# Patient Record
Sex: FEMALE | Race: WHITE | NOT HISPANIC OR LATINO | Employment: FULL TIME | URBAN - METROPOLITAN AREA
[De-identification: names, ages, dates, MRNs, and addresses within clinical notes are randomized per-mention and may not be internally consistent; named-entity substitution may affect disease eponyms.]

---

## 2017-01-23 ENCOUNTER — OFFICE VISIT (OUTPATIENT)
Dept: OBSTETRICS AND GYNECOLOGY | Age: 37
End: 2017-01-23

## 2017-01-23 VITALS
SYSTOLIC BLOOD PRESSURE: 100 MMHG | WEIGHT: 195 LBS | BODY MASS INDEX: 27.92 KG/M2 | HEIGHT: 70 IN | DIASTOLIC BLOOD PRESSURE: 70 MMHG

## 2017-01-23 DIAGNOSIS — Z01.419 ENCOUNTER FOR GYNECOLOGICAL EXAMINATION WITHOUT ABNORMAL FINDING: Primary | ICD-10-CM

## 2017-01-23 DIAGNOSIS — E89.40 OVARIAN FAILURE DUE TO CANCER THERAPY: ICD-10-CM

## 2017-01-23 PROCEDURE — 99395 PREV VISIT EST AGE 18-39: CPT | Performed by: OBSTETRICS & GYNECOLOGY

## 2017-01-23 NOTE — MR AVS SNAPSHOT
Linda BARRIOS Yaron   2017 3:45 PM   Office Visit    Dept Phone:  [phone]   Encounter #:  [id number]    Provider:  Concetta Duncan MD   Department:  [facility]                Your Full Care Plan              Today's Medication Changes          These changes are accurate as of: 17  4:32 PM.  If you have any questions, ask your nurse or doctor.               Stop taking medication(s)listed here:     levothyroxine 150 MCG tablet   Commonly known as:  SYNTHROID                      Your Updated Medication List      Notice  As of 2017  4:32 PM    You have not been prescribed any medications.            You Were Diagnosed With        Codes Comments    Encounter for gynecological examination without abnormal finding    -  Primary ICD-10-CM: Z01.419  ICD-9-CM: V72.31     Ovarian failure due to cancer therapy     ICD-10-CM: E89.40  ICD-9-CM: 256.2       Instructions     None    Patient Instructions History      Upcoming Appointments     Visit Type Date Time Department    WELLNESS 2017  3:45 PM [facility]    LAB 2017  9:00 AM [facility] ONC CBC LAB [facility]      MyChart Signup     [facility] [employer] allows you to send messages to your doctor, view your test results, renew your prescriptions, schedule appointments, and more. To sign up, go to [email or web address] and click on the Sign Up Now link in the New User? box. Enter your [employer] Activation Code exactly as it appears below along with the last four digits of your Social Security Number and your Date of Birth () to complete the sign-up process. If you do not sign up before the expiration date, you must request a new code.    [employer] Activation Code: [email or web address]  Expires: 2017  5:34 AM    If you have questions, you can email [email or web address] or call [phone] to talk to our [employer] staff. Remember, [employer] is NOT to be used for urgent needs. For medical  "emergencies, dial 911.               Other Info from Your Visit           Your Appointments     Feb 14, 2017  9:00 AM EST   Lab with LAB CHAIR 4 [facility] ONCOLOGY CBC LAB ([place])    [address] [place] [id number]   [phone]              Allergies     No Known Allergies      Reason for Visit     Annual Exam PT HERE FOR ANNUAL EXAM, DUE TO HODGKINS LYMPHOMA DOES NOT HAVE PERIODS. NO COMPLAINTS, LAST PAP 2015 (NEG AND NEG HPV).      Vital Signs     Blood Pressure Height Weight Body Mass Index Smoking Status       100/70 70\" (177.8 cm) 195 lb (88.5 kg) 27.98 kg/m2 Never Smoker       Problems and Diagnoses Noted     Ovarian failure due to cancer therapy    Encounter for routine gynecological examination    -  Primary        "

## 2017-01-23 NOTE — PROGRESS NOTES
Routine Annual Visit    1/23/2017    Patient: Linda Marrufo          MR#:[id number]      Chief Complaint   Patient presents with   • Annual Exam     PT HERE FOR ANNUAL EXAM, DUE TO HODGKINS LYMPHOMA DOES NOT HAVE PERIODS. NO COMPLAINTS, LAST PAP 2015 (NEG AND NEG HPV).       History of Present Illness    36 y.o. female No obstetric history on file. who presents for annual exam.   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 Patient is not currently having periods (Reason: Other).  Obstetric History:  OB History     No data available         Menstrual History:     No LMP recorded. Patient is not currently having periods (Reason: Other).       Sexual History:       ________________________________________  Patient Active Problem List   Diagnosis   • Hodgkin's disease   • Ovarian failure due to cancer therapy   • Hypothyroidism, acquired       Past Medical History   Diagnosis Date   • HA (headache)    • History of ovarian cyst    • History of weight gain    • Hodgkin disease      RELAPSED   • Hypothyroidism    • Lymphoma    • Menopause    • Ovarian failure due to cancer therapy        Past Surgical History   Procedure Laterality Date   • Hernia repair       PT WAS 5 YO       History   Smoking Status   • Never Smoker   Smokeless Tobacco   • Not on file       currently has no medications in their medication list.  ________________________________________    Current contraception: post menopausal status  History of abnormal Pap smear: no  Family history of Breast cancer: no  Family history of uterine or ovarian cancer: no  Family History of colon cancer/colon polyps: no  History of abnormal mammogram: no      The following portions of the patient's history were reviewed and updated as appropriate: allergies, current medications, past family history, past medical  "history, past social history, past surgical history and problem list.    Review of Systems    Pertinent items are noted in HPI.     Objective   Physical Exam    Visit Vitals   • /70   • Ht 70\" (177.8 cm)   • Wt 195 lb (88.5 kg)   • LMP Comment: CHEMO   • BMI 27.98 kg/m2      BP Readings from Last 3 Encounters:   01/23/17 100/70   08/30/16 122/70      Wt Readings from Last 3 Encounters:   01/23/17 195 lb (88.5 kg)   08/30/16 197 lb 9.6 oz (89.6 kg)      BMI: Estimated body mass index is 27.98 kg/(m^2) as calculated from the following:    Height as of this encounter: 70\" (177.8 cm).    Weight as of this encounter: 195 lb (88.5 kg).      General:   alert, appears stated age and cooperative   Abdomen: soft, non-tender, without masses or organomegaly   Breast: inspection negative, no nipple discharge or bleeding, no masses or nodularity palpable   Vulva: normal   Vagina: normal mucosa   Cervix: no cervical motion tenderness and no lesions   Uterus: normal size, mobile or non-tender   Adnexa: normal adnexa and no mass, fullness, tenderness     Assessment:    1. Normal annual exam   Assessment     ICD-10-CM ICD-9-CM   1. Encounter for gynecological examination without abnormal finding Z01.419 V72.31   2. Ovarian failure due to cancer therapy E89.40 256.2     Plan:    Plan     []  Mammogram request made  []  PAP done  []  Labs:   []  GC/Chl/TV  []  DEXA scan   []  Referral for colonoscopy:     mammo age 38 as rec by onc    "

## 2017-02-14 ENCOUNTER — APPOINTMENT (OUTPATIENT)
Dept: LAB | Facility: HOSPITAL | Age: 37
End: 2017-02-14

## 2017-02-14 ENCOUNTER — LAB (OUTPATIENT)
Dept: LAB | Facility: HOSPITAL | Age: 37
End: 2017-02-14

## 2017-02-14 DIAGNOSIS — E03.9 HYPOTHYROIDISM, ACQUIRED: ICD-10-CM

## 2017-02-14 DIAGNOSIS — E89.40 OVARIAN FAILURE DUE TO CANCER THERAPY: ICD-10-CM

## 2017-02-14 DIAGNOSIS — C81.90 HODGKIN'S DISEASE (HCC): ICD-10-CM

## 2017-02-14 LAB — TSH SERPL DL<=0.05 MIU/L-ACNC: 5.79 MIU/ML (ref 0.27–4.2)

## 2017-02-14 PROCEDURE — 84443 ASSAY THYROID STIM HORMONE: CPT | Performed by: INTERNAL MEDICINE

## 2017-02-14 PROCEDURE — 36415 COLL VENOUS BLD VENIPUNCTURE: CPT | Performed by: INTERNAL MEDICINE

## 2017-02-16 ENCOUNTER — TELEPHONE (OUTPATIENT)
Dept: ONCOLOGY | Facility: CLINIC | Age: 37
End: 2017-02-16

## 2017-02-16 NOTE — TELEPHONE ENCOUNTER
----- Message from Cara Bliss sent at 2/16/2017 10:40 AM EST -----   Pt is calling about having some labs done        [phone]

## 2017-02-16 NOTE — TELEPHONE ENCOUNTER
Pt. States she was suppose to go to [facility] for labs tomorrow, but they told her she could get her labs done here rather than making a trip all the way there just for labs.  Also felt her md visit here should be moved up.  Last seen here by dr. Bhatt was 8/30/16 and his note states he would see her in 1 yr.  Pt. Would rather come in here and get labs and see dr. Bhatt in the next month or so.  Informed pt. i will s/w dr. Bhatt next week and will get back with her.  Pt denies having any problems at the present time.

## 2017-02-22 ENCOUNTER — TELEPHONE (OUTPATIENT)
Dept: ONCOLOGY | Facility: CLINIC | Age: 37
End: 2017-02-22

## 2017-02-22 DIAGNOSIS — C81.91 HODGKIN LYMPHOMA OF LYMPH NODES OF NECK, UNSPECIFIED HODGKIN LYMPHOMA TYPE (HCC): Primary | ICD-10-CM

## 2017-02-22 NOTE — TELEPHONE ENCOUNTER
S/w pt last wk concerning not going to [place] just for labs.  Would like to come here for labs and see dr. Bhatt before her sched. appt in august.  yearly pt now.  S/w dr. Bhatt, will sched pt. To come in for a cbc, iron panel, ferritin, cmp, and tsh and then 1 wk later  To see dr. Bhatt.  To see md at next available-2 units.  Informed pt. The appt desk will be calling her to get this set up.  V/u.

## 2017-02-28 ENCOUNTER — LAB (OUTPATIENT)
Dept: LAB | Facility: HOSPITAL | Age: 37
End: 2017-02-28

## 2017-02-28 DIAGNOSIS — C81.91 HODGKIN LYMPHOMA OF LYMPH NODES OF NECK, UNSPECIFIED HODGKIN LYMPHOMA TYPE (HCC): ICD-10-CM

## 2017-02-28 LAB
BASOPHILS # BLD AUTO: 0.03 10*3/MM3 (ref 0–0.1)
BASOPHILS NFR BLD AUTO: 0.6 % (ref 0–1.1)
DEPRECATED RDW RBC AUTO: 40.5 FL (ref 37–49)
EOSINOPHIL # BLD AUTO: 0.04 10*3/MM3 (ref 0–0.36)
EOSINOPHIL NFR BLD AUTO: 0.8 % (ref 1–5)
ERYTHROCYTE [DISTWIDTH] IN BLOOD BY AUTOMATED COUNT: 12.3 % (ref 11.7–14.5)
FERRITIN SERPL-MCNC: 344.9 NG/ML (ref 11–207)
FOLATE SERPL-MCNC: 16.83 NG/ML (ref 4.78–24.2)
HCT VFR BLD AUTO: 38.7 % (ref 34–45)
HGB BLD-MCNC: 12.9 G/DL (ref 11.5–14.9)
IMM GRANULOCYTES # BLD: 0.01 10*3/MM3 (ref 0–0.03)
IMM GRANULOCYTES NFR BLD: 0.2 % (ref 0–0.5)
LYMPHOCYTES # BLD AUTO: 2.18 10*3/MM3 (ref 1–3.5)
LYMPHOCYTES NFR BLD AUTO: 41.2 % (ref 20–49)
MCH RBC QN AUTO: 30.4 PG (ref 27–33)
MCHC RBC AUTO-ENTMCNC: 33.3 G/DL (ref 32–35)
MCV RBC AUTO: 91.1 FL (ref 83–97)
MONOCYTES # BLD AUTO: 0.45 10*3/MM3 (ref 0.25–0.8)
MONOCYTES NFR BLD AUTO: 8.5 % (ref 4–12)
NEUTROPHILS # BLD AUTO: 2.58 10*3/MM3 (ref 1.5–7)
NEUTROPHILS NFR BLD AUTO: 48.7 % (ref 39–75)
NRBC BLD MANUAL-RTO: 0 /100 WBC (ref 0–0)
PLATELET # BLD AUTO: 168 10*3/MM3 (ref 150–375)
PMV BLD AUTO: 8 FL (ref 8.9–12.1)
RBC # BLD AUTO: 4.25 10*6/MM3 (ref 3.9–5)
VIT B12 BLD-MCNC: 320 PG/ML (ref 250–999)
WBC NRBC COR # BLD: 5.29 10*3/MM3 (ref 4–10)

## 2017-02-28 PROCEDURE — 82728 ASSAY OF FERRITIN: CPT | Performed by: INTERNAL MEDICINE

## 2017-02-28 PROCEDURE — 82607 VITAMIN B-12: CPT | Performed by: INTERNAL MEDICINE

## 2017-02-28 PROCEDURE — 36415 COLL VENOUS BLD VENIPUNCTURE: CPT | Performed by: INTERNAL MEDICINE

## 2017-02-28 PROCEDURE — 85025 COMPLETE CBC W/AUTO DIFF WBC: CPT | Performed by: INTERNAL MEDICINE

## 2017-02-28 PROCEDURE — 82746 ASSAY OF FOLIC ACID SERUM: CPT | Performed by: INTERNAL MEDICINE

## 2017-03-09 ENCOUNTER — APPOINTMENT (OUTPATIENT)
Dept: LAB | Facility: HOSPITAL | Age: 37
End: 2017-03-09

## 2017-03-09 ENCOUNTER — OFFICE VISIT (OUTPATIENT)
Dept: ONCOLOGY | Facility: CLINIC | Age: 37
End: 2017-03-09

## 2017-03-09 VITALS
RESPIRATION RATE: 16 BRPM | HEIGHT: 72 IN | DIASTOLIC BLOOD PRESSURE: 66 MMHG | OXYGEN SATURATION: 100 % | WEIGHT: 194.4 LBS | BODY MASS INDEX: 26.33 KG/M2 | HEART RATE: 75 BPM | TEMPERATURE: 98.2 F | SYSTOLIC BLOOD PRESSURE: 92 MMHG

## 2017-03-09 DIAGNOSIS — C81.21 MIXED CELLULARITY HODGKIN LYMPHOMA OF LYMPH NODES OF NECK (HCC): Primary | ICD-10-CM

## 2017-03-09 DIAGNOSIS — E89.40 OVARIAN FAILURE DUE TO CANCER THERAPY: ICD-10-CM

## 2017-03-09 DIAGNOSIS — E03.9 HYPOTHYROIDISM, ACQUIRED: ICD-10-CM

## 2017-03-09 PROCEDURE — G0463 HOSPITAL OUTPT CLINIC VISIT: HCPCS | Performed by: INTERNAL MEDICINE

## 2017-03-09 PROCEDURE — 99213 OFFICE O/P EST LOW 20 MIN: CPT | Performed by: INTERNAL MEDICINE

## 2017-03-09 RX ORDER — LEVOTHYROXINE SODIUM 0.15 MG/1
150 TABLET ORAL DAILY
COMMUNITY
End: 2017-03-09 | Stop reason: SDUPTHER

## 2017-03-09 RX ORDER — LEVOTHYROXINE SODIUM 175 UG/1
175 TABLET ORAL DAILY
Qty: 90 TABLET | Refills: 3 | Status: SHIPPED | OUTPATIENT
Start: 2017-03-09 | End: 2018-05-08 | Stop reason: SDUPTHER

## 2017-03-09 NOTE — PROGRESS NOTES
Subjective   REASONS FOR FOLLOWUP:     1. Relapsed Hodgkin disease, now status post high dose therapy and autologous stem cell rescue at [place] on 01/28/2011.   2. Radiation therapy to the left neck and supraclavicular area at [facility] with Dr. Wilkerson completed 04/28/2011.   3. Ovarian failure following chemotherapy currently on hormone replacement and following up with Dr. Concetta Duncan, OB-GYN.   4. Hypothyroidism on Synthroid replacement.     HISTORY OF PRESENT ILLNESS:   Linda is a 36-year-old female with the above-noted history of recurrent Hodgkin disease treated with high-dose therapy and stem cell transplant at [facility] in January 2011. Following her transplant, she also received radiation therapy to the left neck and supraclavicular area which she completed in April 2011.    She was seen at the [place] bone marrow transplant clinic in February 2016 and at that point was 5 years out from her transplant and was released from further follow-up in [place].       She is here today for routine  followup and seems to be doing just fine.  He will need an adjustment on her thyroid replacement therapy since her TSH level was still greater than 5 on her last labs.  History of Present Illness      Past Medical History, Past Surgical History, Social History, Family History have been reviewed and are without significant changes except as mentioned.    Review of Systems   Constitutional: Negative for activity change, appetite change, fatigue, fever and unexpected weight change.   HENT: Negative for hearing loss, nosebleeds, trouble swallowing and voice change.    Eyes: Negative for visual disturbance.   Respiratory: Negative for cough, shortness of breath and wheezing.    Cardiovascular: Negative for chest pain and palpitations.   Gastrointestinal: Negative for abdominal pain, diarrhea, nausea and vomiting.   Genitourinary: Negative for difficulty urinating,  "frequency, hematuria and urgency.   Musculoskeletal: Negative for back pain and neck pain.   Skin: Negative for rash.   Neurological: Negative for dizziness, seizures, syncope and headaches.   Hematological: Negative for adenopathy. Does not bruise/bleed easily.   Psychiatric/Behavioral: Negative for behavioral problems. The patient is not nervous/anxious.       A comprehensive 14 point review of systems was performed and was negative except as mentioned.    Medications:  The current medication list was reviewed in the EMR    ALLERGIES:  No Known Allergies    Objective      Vitals:    03/09/17 1607   BP: 92/66   Pulse: 75   Resp: 16   Temp: 98.2 °F (36.8 °C)   TempSrc: Oral   SpO2: 100%   Weight: 194 lb 6.4 oz (88.2 kg)   Height: 72.05\" (183 cm)  Comment: new ht   PainSc: 0-No pain     Current Status 3/9/2017   ECOG score 0       Physical Exam   Constitutional: She is oriented to person, place, and time. She appears well-developed and well-nourished. No distress.   HENT:   Head: Normocephalic.   Eyes: Conjunctivae and EOM are normal. Pupils are equal, round, and reactive to light. No scleral icterus.   Neck: Normal range of motion. Neck supple. No JVD present. No thyromegaly present.   Cardiovascular: Normal rate and regular rhythm.  Exam reveals no gallop and no friction rub.    No murmur heard.  Pulmonary/Chest: Effort normal and breath sounds normal. She has no wheezes. She has no rales.   Abdominal: Soft. She exhibits no distension and no mass. There is no tenderness.   Musculoskeletal: Normal range of motion. She exhibits no edema or deformity.   Lymphadenopathy:     She has no cervical adenopathy.   Neurological: She is alert and oriented to person, place, and time. She has normal reflexes. No cranial nerve deficit.   Skin: Skin is warm and dry. No rash noted. No erythema.   Psychiatric: She has a normal mood and affect. Her behavior is normal. Judgment normal.         RECENT LABS:  Hematology WBC   Date Value " Ref Range Status   02/28/2017 5.29 4.00 - 10.00 10*3/mm3 Final     RBC   Date Value Ref Range Status   02/28/2017 4.25 3.90 - 5.00 10*6/mm3 Final     HEMOGLOBIN   Date Value Ref Range Status   02/28/2017 12.9 11.5 - 14.9 g/dL Final     HEMATOCRIT   Date Value Ref Range Status   02/28/2017 38.7 34.0 - 45.0 % Final     PLATELETS   Date Value Ref Range Status   02/28/2017 168 150 - 375 10*3/mm3 Final            Lab Results   Component Value Date    TSH 5.790 02/14/2017       Assessment/Plan   ASSESSMENT:   1. Hodgkin disease in remission following high dose therapy and autologous stem cell transplant at [place] in late January 2011. The patient continues to do well with no evidence of disease, now over 6 years out from her transplant.   2. Post-transplant consolidation radiation left neck and supraclavicular area completed 04/28/2011.   3. Ovarian failure following high-dose chemotherapy. The patient continues to followup with her OB/GYN, Dr. Concetta Duncan.   4. Hypothyroidism, currently on Synthroid replacement.  Her last TSH level was still greater than 5 therefore we will increase her dose of Synthroid from 150 µg daily to 175 µg daily.      PLAN:   1. She will return to our office in 6 months for routine followup.  Her labs will be drawn one week prior to that visit including CBC serum chemistries and thyroid studies.  2. We discussed when to start breast cancer screening and colon cancer screening. The latest recommendations indicate breast cancer screening 8-10 years after treatment or at age 40. Since her initial chest irradiation was in 2010 we will probably start breast imaging in 2018 when she is 38 and start screening colonoscopies at age 40.                   3/9/2017      CC:

## 2017-04-11 ENCOUNTER — TELEPHONE (OUTPATIENT)
Dept: OBSTETRICS AND GYNECOLOGY | Age: 37
End: 2017-04-11

## 2017-04-11 NOTE — TELEPHONE ENCOUNTER
Have her c/i for a problem visit next week with u/s, me, Dakota or Mely and confirm she isn't pregnant

## 2017-04-11 NOTE — TELEPHONE ENCOUNTER
"Dr GREEN pt, called and states no period since 2009 (pt was doing rad and chemo for Hodgkin's lym, completed in 03/2011) pt states 2 weeks ago synthroid changed from 150 mcg to 175mcg (has never had an issue w/ increase in med) has felt generally \"blah\" and 3 days ago she started spotting bright red, now darker red and using light tampons. No clots and only mild cramping. Please advise. Pt states she will be taking UPT after work.    Pt # [phone]  "

## 2017-04-18 ENCOUNTER — PROCEDURE VISIT (OUTPATIENT)
Dept: OBSTETRICS AND GYNECOLOGY | Age: 37
End: 2017-04-18

## 2017-04-18 ENCOUNTER — OFFICE VISIT (OUTPATIENT)
Dept: OBSTETRICS AND GYNECOLOGY | Age: 37
End: 2017-04-18

## 2017-04-18 VITALS
SYSTOLIC BLOOD PRESSURE: 100 MMHG | HEIGHT: 72 IN | WEIGHT: 189 LBS | BODY MASS INDEX: 25.6 KG/M2 | DIASTOLIC BLOOD PRESSURE: 70 MMHG

## 2017-04-18 DIAGNOSIS — E89.40 OVARIAN FAILURE DUE TO CANCER THERAPY: ICD-10-CM

## 2017-04-18 DIAGNOSIS — N92.6 MENSES, IRREGULAR: Primary | ICD-10-CM

## 2017-04-18 DIAGNOSIS — N93.9 VAGINA BLEEDING: Primary | ICD-10-CM

## 2017-04-18 PROCEDURE — 76830 TRANSVAGINAL US NON-OB: CPT | Performed by: PHYSICIAN ASSISTANT

## 2017-04-18 PROCEDURE — 99214 OFFICE O/P EST MOD 30 MIN: CPT | Performed by: NURSE PRACTITIONER

## 2017-04-18 RX ORDER — POLYMYXIN B SULFATE AND TRIMETHOPRIM 1; 10000 MG/ML; [USP'U]/ML
SOLUTION OPHTHALMIC
Refills: 0 | COMMUNITY
Start: 2017-03-28 | End: 2017-10-17

## 2017-04-18 NOTE — PROGRESS NOTES
"Subjective   Lindasa SOPHIE Marrufo is a 37 y.o. female is here today for follow-up.    History of Present Illness   Chief Complaint   Patient presents with   • Vaginal Bleeding     Linda, \"Kayla\" is here to discuss her cycles.  She is a patient of Dr Mena but a new patient to me.  She has a history of Lymphoma and premature ovarian failure after chemo.  She also underwent a stem cell transplant 6 years ago.  She has not had any vaginal bleeding since that time and was found to be menopausal base on FSH.  This month she started a period last week.  She spotted for a few days and then had 3-4 days of a normal flow, like a period.  It has stopped now.  She did check a pregnancy test which was negative.  She is on thyroid replacement and her PCP follows those levels every 6 months.  She had some cramping but nothing major.  She did have some breast tenderness a few weeks ago but it went away and she didn't think much of it.  She also noticed a migraine when she was bleeding but it is gone now as well.    The following portions of the patient's history were reviewed and updated as appropriate: allergies, current medications, past family history, past medical history, past social history, past surgical history and problem list.    Review of Systems   Constitutional: Negative.    HENT: Negative.    Eyes: Negative.    Respiratory: Negative.    Cardiovascular: Negative.    Gastrointestinal: Negative.    Endocrine: Negative.    Genitourinary: Negative.    Musculoskeletal: Negative.    Skin: Negative.    Allergic/Immunologic: Negative.    Neurological: Negative.    Hematological: Negative.    Psychiatric/Behavioral: Negative.        Objective   Physical Exam   Constitutional: She is oriented to person, place, and time. She appears well-developed and well-nourished.   Genitourinary: Vagina normal and uterus normal. Uterus is not tender. Cervix exhibits no motion tenderness, no discharge and no friability. Right adnexum " displays no mass and no tenderness. Left adnexum displays no mass and no tenderness. No bleeding in the vagina.   Genitourinary Comments: No bleeding    Neurological: She is alert and oriented to person, place, and time.   Skin: Skin is warm and dry.   Psychiatric: She has a normal mood and affect.         Assessment/Plan   Linda was seen today for vaginal bleeding.    Diagnoses and all orders for this visit:    Menses, irregular  -     Follicle Stimulating Hormone    Ovarian failure due to cancer therapy      Ultrasound done. Uterus normal and lining is 3.7mm.  There is a small follicle on her right ovary (9mm x 6mm) and this could be consistent with her current cycle day and potential ovulation. We will check an FSH level today.  Patient would love to be able to get pregnant if possible but hadn't considered it.  She will start on prenatal vitamins.  She isn't sure if her boyfriend really wants kids or not so she will discuss with him.  We did discuss AMA and she understands that risks for genetic disorders and pregnancy complications are higher as you age.  She will use condoms for now and continue to monitor. We will call with FSH.

## 2017-04-19 ENCOUNTER — TELEPHONE (OUTPATIENT)
Dept: OBSTETRICS AND GYNECOLOGY | Age: 37
End: 2017-04-19

## 2017-04-19 LAB — FSH SERPL-ACNC: 81.6 MIU/ML

## 2017-04-20 ENCOUNTER — TELEPHONE (OUTPATIENT)
Dept: OBSTETRICS AND GYNECOLOGY | Age: 37
End: 2017-04-20

## 2017-04-20 NOTE — TELEPHONE ENCOUNTER
NOTIFIED PT OF RESULTS, WOULD LIKE TO KNOW HOW IT IS POSSIBLE THAT IS SHE CAN BE IN MENOPAUSE? ALSO, WHAT IS NEXT STEP?

## 2017-04-20 NOTE — TELEPHONE ENCOUNTER
Discussed FSH continues to be menopausal  rec consult with RE if desires pregnancy, could consider donor eggs or adopt an embryo

## 2017-04-20 NOTE — TELEPHONE ENCOUNTER
----- Message from Concetta Duncan MD sent at 4/20/2017  6:41 AM EDT -----  Notify FSH is still in menopausal range

## 2017-08-17 ENCOUNTER — APPOINTMENT (OUTPATIENT)
Dept: LAB | Facility: HOSPITAL | Age: 37
End: 2017-08-17

## 2017-09-08 ENCOUNTER — APPOINTMENT (OUTPATIENT)
Dept: ONCOLOGY | Facility: CLINIC | Age: 37
End: 2017-09-08

## 2017-09-08 ENCOUNTER — APPOINTMENT (OUTPATIENT)
Dept: LAB | Facility: HOSPITAL | Age: 37
End: 2017-09-08

## 2017-10-06 ENCOUNTER — LAB (OUTPATIENT)
Dept: LAB | Facility: HOSPITAL | Age: 37
End: 2017-10-06

## 2017-10-06 DIAGNOSIS — C81.21 MIXED CELLULARITY HODGKIN LYMPHOMA OF LYMPH NODES OF NECK (HCC): ICD-10-CM

## 2017-10-06 DIAGNOSIS — E03.9 HYPOTHYROIDISM, ACQUIRED: ICD-10-CM

## 2017-10-06 DIAGNOSIS — C81.91 HODGKIN LYMPHOMA OF LYMPH NODES OF NECK, UNSPECIFIED HODGKIN LYMPHOMA TYPE (HCC): ICD-10-CM

## 2017-10-06 DIAGNOSIS — E89.40 OVARIAN FAILURE DUE TO CANCER THERAPY: ICD-10-CM

## 2017-10-06 LAB
ALBUMIN SERPL-MCNC: 4.2 G/DL (ref 3.5–5.2)
ALBUMIN/GLOB SERPL: 1.7 G/DL (ref 1.1–2.4)
ALP SERPL-CCNC: 47 U/L (ref 38–116)
ALT SERPL W P-5'-P-CCNC: 17 U/L (ref 0–33)
ANION GAP SERPL CALCULATED.3IONS-SCNC: 12.5 MMOL/L
AST SERPL-CCNC: 35 U/L (ref 0–32)
BASOPHILS # BLD AUTO: 0.03 10*3/MM3 (ref 0–0.1)
BASOPHILS NFR BLD AUTO: 0.6 % (ref 0–1.1)
BILIRUB SERPL-MCNC: 0.6 MG/DL (ref 0.1–1.2)
BUN BLD-MCNC: 14 MG/DL (ref 6–20)
BUN/CREAT SERPL: 15.9 (ref 7.3–30)
CALCIUM SPEC-SCNC: 9.3 MG/DL (ref 8.5–10.2)
CHLORIDE SERPL-SCNC: 103 MMOL/L (ref 98–107)
CO2 SERPL-SCNC: 26.5 MMOL/L (ref 22–29)
CREAT BLD-MCNC: 0.88 MG/DL (ref 0.6–1.1)
DEPRECATED RDW RBC AUTO: 38.9 FL (ref 37–49)
EOSINOPHIL # BLD AUTO: 0.06 10*3/MM3 (ref 0–0.36)
EOSINOPHIL NFR BLD AUTO: 1.3 % (ref 1–5)
ERYTHROCYTE [DISTWIDTH] IN BLOOD BY AUTOMATED COUNT: 11.9 % (ref 11.7–14.5)
FERRITIN SERPL-MCNC: 266.5 NG/ML (ref 11–207)
GFR SERPL CREATININE-BSD FRML MDRD: 72 ML/MIN/1.73
GLOBULIN UR ELPH-MCNC: 2.5 GM/DL (ref 1.8–3.5)
GLUCOSE BLD-MCNC: 91 MG/DL (ref 74–124)
HCT VFR BLD AUTO: 36.8 % (ref 34–45)
HGB BLD-MCNC: 12.6 G/DL (ref 11.5–14.9)
HGB RETIC QN: 35 PG (ref 29.8–36.1)
IMM GRANULOCYTES # BLD: 0.01 10*3/MM3 (ref 0–0.03)
IMM GRANULOCYTES NFR BLD: 0.2 % (ref 0–0.5)
IMM RETICS NFR: 8 % (ref 3–15.8)
IRON 24H UR-MRATE: 120 MCG/DL (ref 37–145)
LYMPHOCYTES # BLD AUTO: 2.03 10*3/MM3 (ref 1–3.5)
LYMPHOCYTES NFR BLD AUTO: 43.8 % (ref 20–49)
MCH RBC QN AUTO: 30.7 PG (ref 27–33)
MCHC RBC AUTO-ENTMCNC: 34.2 G/DL (ref 32–35)
MCV RBC AUTO: 89.8 FL (ref 83–97)
MONOCYTES # BLD AUTO: 0.41 10*3/MM3 (ref 0.25–0.8)
MONOCYTES NFR BLD AUTO: 8.8 % (ref 4–12)
NEUTROPHILS # BLD AUTO: 2.1 10*3/MM3 (ref 1.5–7)
NEUTROPHILS NFR BLD AUTO: 45.3 % (ref 39–75)
NRBC BLD MANUAL-RTO: 0 /100 WBC (ref 0–0)
PLATELET # BLD AUTO: 173 10*3/MM3 (ref 150–375)
PMV BLD AUTO: 8.1 FL (ref 8.9–12.1)
POTASSIUM BLD-SCNC: 3.7 MMOL/L (ref 3.5–4.7)
PROT SERPL-MCNC: 6.7 G/DL (ref 6.3–8)
RBC # BLD AUTO: 4.1 10*6/MM3 (ref 3.9–5)
RETICS/RBC NFR AUTO: 1.43 % (ref 0.6–2)
SODIUM BLD-SCNC: 142 MMOL/L (ref 134–145)
T4 FREE SERPL-MCNC: 1.58 NG/DL (ref 0.93–1.7)
TSH SERPL DL<=0.05 MIU/L-ACNC: 2.35 MIU/ML (ref 0.27–4.2)
WBC NRBC COR # BLD: 4.64 10*3/MM3 (ref 4–10)

## 2017-10-06 PROCEDURE — 80053 COMPREHEN METABOLIC PANEL: CPT | Performed by: INTERNAL MEDICINE

## 2017-10-06 PROCEDURE — 82728 ASSAY OF FERRITIN: CPT | Performed by: INTERNAL MEDICINE

## 2017-10-06 PROCEDURE — 36415 COLL VENOUS BLD VENIPUNCTURE: CPT | Performed by: INTERNAL MEDICINE

## 2017-10-06 PROCEDURE — 84443 ASSAY THYROID STIM HORMONE: CPT | Performed by: INTERNAL MEDICINE

## 2017-10-06 PROCEDURE — 83540 ASSAY OF IRON: CPT | Performed by: INTERNAL MEDICINE

## 2017-10-06 PROCEDURE — 84439 ASSAY OF FREE THYROXINE: CPT | Performed by: INTERNAL MEDICINE

## 2017-10-06 PROCEDURE — 85046 RETICYTE/HGB CONCENTRATE: CPT | Performed by: INTERNAL MEDICINE

## 2017-10-06 PROCEDURE — 85025 COMPLETE CBC W/AUTO DIFF WBC: CPT | Performed by: INTERNAL MEDICINE

## 2017-10-13 ENCOUNTER — APPOINTMENT (OUTPATIENT)
Dept: ONCOLOGY | Facility: CLINIC | Age: 37
End: 2017-10-13

## 2017-10-13 ENCOUNTER — APPOINTMENT (OUTPATIENT)
Dept: LAB | Facility: HOSPITAL | Age: 37
End: 2017-10-13

## 2017-10-17 ENCOUNTER — OFFICE VISIT (OUTPATIENT)
Dept: ONCOLOGY | Facility: CLINIC | Age: 37
End: 2017-10-17

## 2017-10-17 ENCOUNTER — APPOINTMENT (OUTPATIENT)
Dept: LAB | Facility: HOSPITAL | Age: 37
End: 2017-10-17

## 2017-10-17 VITALS
TEMPERATURE: 97.9 F | BODY MASS INDEX: 26.63 KG/M2 | HEIGHT: 72 IN | SYSTOLIC BLOOD PRESSURE: 112 MMHG | WEIGHT: 196.6 LBS | OXYGEN SATURATION: 98 % | RESPIRATION RATE: 12 BRPM | DIASTOLIC BLOOD PRESSURE: 78 MMHG | HEART RATE: 65 BPM

## 2017-10-17 DIAGNOSIS — E03.9 HYPOTHYROIDISM, ACQUIRED: ICD-10-CM

## 2017-10-17 DIAGNOSIS — C81.21 MIXED CELLULARITY HODGKIN LYMPHOMA OF LYMPH NODES OF NECK (HCC): Primary | ICD-10-CM

## 2017-10-17 PROCEDURE — 99213 OFFICE O/P EST LOW 20 MIN: CPT | Performed by: INTERNAL MEDICINE

## 2017-10-17 PROCEDURE — G0463 HOSPITAL OUTPT CLINIC VISIT: HCPCS | Performed by: INTERNAL MEDICINE

## 2017-10-17 NOTE — PROGRESS NOTES
Subjective   REASONS FOR FOLLOWUP:     1. Relapsed Hodgkin disease, now status post high dose therapy and autologous stem cell rescue at [place] on 01/28/2011.   2. Radiation therapy to the left neck and supraclavicular area at [facility] [place] with Dr. Wilkerson completed 04/28/2011.   3. Ovarian failure following chemotherapy currently on hormone replacement and following up with Dr. Concetta Duncan, OB-GYN.   4. Hypothyroidism on Synthroid replacement.     HISTORY OF PRESENT ILLNESS:   Linda is a 37 y.o. female with the above-noted history of recurrent Hodgkin disease treated with high-dose therapy and stem cell transplant at [facility] in January 2011. Following her transplant, she also received radiation therapy to the left neck and supraclavicular area which she completed in April 2011.    She was seen at the [place] bone marrow transplant clinic in February 2016 and at that point was 5 years out from her transplant and was released from further follow-up in [place].       She is here today for routine  followup and seems to be doing just fine.  She had her thyroid replacement dose increased on her last visit and underwent repeat labs on 10/6/2017 showing that her TSH and free T4 are now within normal limits.    She looks great and tells me that she is now working part-time at [employer] on [address].     History of Present Illness      Past Medical History, Past Surgical History, Social History, Family History have been reviewed and are without significant changes except as mentioned.    Review of Systems   Constitutional: Negative for activity change, appetite change, fatigue, fever and unexpected weight change.   HENT: Negative for hearing loss, nosebleeds, trouble swallowing and voice change.    Eyes: Negative for visual disturbance.   Respiratory: Negative for cough, shortness of breath and wheezing.    Cardiovascular: Negative for chest pain and  "palpitations.   Gastrointestinal: Negative for abdominal pain, diarrhea, nausea and vomiting.   Genitourinary: Negative for difficulty urinating, frequency, hematuria and urgency.   Musculoskeletal: Negative for back pain and neck pain.   Skin: Negative for rash.   Neurological: Negative for dizziness, seizures, syncope and headaches.   Hematological: Negative for adenopathy. Does not bruise/bleed easily.   Psychiatric/Behavioral: Negative for behavioral problems. The patient is not nervous/anxious.       A comprehensive 14 point review of systems was performed and was negative except as mentioned.    Medications:  The current medication list was reviewed in the EMR    ALLERGIES:  No Known Allergies    Objective      Vitals:    10/17/17 0821   BP: 112/78   Pulse: 65   Resp: 12   Temp: 97.9 °F (36.6 °C)   TempSrc: Oral   SpO2: 98%   Weight: 196 lb 9.6 oz (89.2 kg)   Height: 72.44\" (184 cm)  Comment: new height   PainSc: 0-No pain     Current Status 10/17/2017   ECOG score 0       Physical Exam   Constitutional: She is oriented to person, place, and time. She appears well-developed and well-nourished. No distress.   HENT:   Head: Normocephalic.   Eyes: Conjunctivae and EOM are normal. Pupils are equal, round, and reactive to light. No scleral icterus.   Neck: Normal range of motion. Neck supple. No JVD present. No thyromegaly present.   Cardiovascular: Normal rate and regular rhythm.  Exam reveals no gallop and no friction rub.    No murmur heard.  Pulmonary/Chest: Effort normal and breath sounds normal. She has no wheezes. She has no rales.   Abdominal: Soft. She exhibits no distension and no mass. There is no tenderness.   Musculoskeletal: Normal range of motion. She exhibits no edema or deformity.   Lymphadenopathy:     She has no cervical adenopathy.   Neurological: She is alert and oriented to person, place, and time. She has normal reflexes. No cranial nerve deficit.   Skin: Skin is warm and dry. No rash noted. " No erythema.   Psychiatric: She has a normal mood and affect. Her behavior is normal. Judgment normal.         RECENT LABS:  Hematology WBC   Date Value Ref Range Status   10/06/2017 4.64 4.00 - 10.00 10*3/mm3 Final     RBC   Date Value Ref Range Status   10/06/2017 4.10 3.90 - 5.00 10*6/mm3 Final     Hemoglobin   Date Value Ref Range Status   10/06/2017 12.6 11.5 - 14.9 g/dL Final     Hematocrit   Date Value Ref Range Status   10/06/2017 36.8 34.0 - 45.0 % Final     Platelets   Date Value Ref Range Status   10/06/2017 173 150 - 375 10*3/mm3 Final            Lab Results   Component Value Date    TSH 2.350 10/06/2017       Assessment/Plan   ASSESSMENT:   1. Hodgkin disease in remission following high dose therapy and autologous stem cell transplant at [place] in late January 2011. The patient continues to do well with no evidence of disease, now over 6 years out from her transplant.   2. Post-transplant consolidation radiation left neck and supraclavicular area completed 04/28/2011.   3. Ovarian failure following high-dose chemotherapy. The patient continues to followup with her OB/GYN, Dr. Concetta Duncan.                   4. Hypothyroidism, currently on Synthroid replacement.     PLAN:   1. At this point, we feel Priscilla can follow-up in our office on an annual basis with repeat labs drawn 1 week prior to visit including CBC, serum chemistries, and thyroid studies.  2. We discussed when to start breast cancer screening and colon cancer screening. The latest recommendations indicate breast cancer screening 8-10 years after treatment or at age 40. Since her initial chest irradiation was in 2010 we would recommend starting breast imaging in 2018 when she is 38 and start screening colonoscopies at age 40.                   10/17/2017      CC:

## 2018-04-12 ENCOUNTER — TELEPHONE (OUTPATIENT)
Dept: ORTHOPEDIC SURGERY | Facility: CLINIC | Age: 38
End: 2018-04-12

## 2018-04-13 NOTE — TELEPHONE ENCOUNTER
I thought this one was also yours.    Unfortunately, as discussed, I really don't have any slots until after I get back.    She may want to see the sports medicine docs at [facility], or you may want to check with someone else here in the office.    Sorry.

## 2018-05-08 DIAGNOSIS — E03.9 HYPOTHYROIDISM, ACQUIRED: ICD-10-CM

## 2018-05-08 DIAGNOSIS — C81.21 MIXED CELLULARITY HODGKIN LYMPHOMA OF LYMPH NODES OF NECK (HCC): ICD-10-CM

## 2018-05-08 DIAGNOSIS — E89.40 OVARIAN FAILURE DUE TO CANCER THERAPY: ICD-10-CM

## 2018-05-08 RX ORDER — LEVOTHYROXINE SODIUM 175 UG/1
TABLET ORAL
Qty: 30 TABLET | Refills: 2 | Status: SHIPPED | OUTPATIENT
Start: 2018-05-08 | End: 2019-07-01 | Stop reason: SDUPTHER

## 2019-01-07 ENCOUNTER — PROCEDURE VISIT (OUTPATIENT)
Dept: OBSTETRICS AND GYNECOLOGY | Age: 39
End: 2019-01-07

## 2019-01-07 ENCOUNTER — OFFICE VISIT (OUTPATIENT)
Dept: OBSTETRICS AND GYNECOLOGY | Age: 39
End: 2019-01-07

## 2019-01-07 ENCOUNTER — APPOINTMENT (OUTPATIENT)
Dept: WOMENS IMAGING | Facility: HOSPITAL | Age: 39
End: 2019-01-07

## 2019-01-07 VITALS
SYSTOLIC BLOOD PRESSURE: 112 MMHG | BODY MASS INDEX: 24.5 KG/M2 | DIASTOLIC BLOOD PRESSURE: 66 MMHG | HEIGHT: 71 IN | WEIGHT: 175 LBS

## 2019-01-07 DIAGNOSIS — Z12.31 VISIT FOR SCREENING MAMMOGRAM: Primary | ICD-10-CM

## 2019-01-07 DIAGNOSIS — Z11.51 SCREENING FOR HPV (HUMAN PAPILLOMAVIRUS): ICD-10-CM

## 2019-01-07 DIAGNOSIS — Z12.4 SCREENING FOR CERVICAL CANCER: ICD-10-CM

## 2019-01-07 DIAGNOSIS — Z01.419 ENCOUNTER FOR GYNECOLOGICAL EXAMINATION WITHOUT ABNORMAL FINDING: Primary | ICD-10-CM

## 2019-01-07 DIAGNOSIS — Z12.39 SCREENING FOR BREAST CANCER: ICD-10-CM

## 2019-01-07 PROCEDURE — 77067 SCR MAMMO BI INCL CAD: CPT | Performed by: RADIOLOGY

## 2019-01-07 PROCEDURE — 99395 PREV VISIT EST AGE 18-39: CPT | Performed by: OBSTETRICS & GYNECOLOGY

## 2019-01-07 PROCEDURE — 77067 SCR MAMMO BI INCL CAD: CPT | Performed by: OBSTETRICS & GYNECOLOGY

## 2019-01-07 NOTE — PROGRESS NOTES
Routine Annual Visit    2019    Patient: Linda Marrufo          MR#:[id number]      Chief Complaint   Patient presents with   • Annual Exam     PT HERE FOR ROUTINE AE, SHE IS WELL. LAST PAP  NEG. HAS HAD 2 PERIODS SINCE 2017.       History of Present Illness    38 y.o. female  who presents for annual exam.   No HF or NS  Menopausal due to chemotherapy with previous lymphoma, pt had BMT in past  Stable from onc view  Will need yearly mammo starting this year secondary to history of chest radiation  Did consult with [facility]    Broke up with BF of 12 years, pt is doing ok        No LMP recorded. Patient is not currently having periods (Reason: Other).  Obstetric History:  OB History      Para Term  AB Living    0 0 0 0 0 0    SAB TAB Ectopic Molar Multiple Live Births    0 0 0   0           Menstrual History:     No LMP recorded. Patient is not currently having periods (Reason: Other).       Sexual History:       ________________________________________  Patient Active Problem List   Diagnosis   • Hodgkin's disease (CMS/HCC)   • Ovarian failure due to cancer therapy   • Hypothyroidism, acquired       Past Medical History:   Diagnosis Date   • HA (headache)    • History of ovarian cyst    • History of weight gain    • Hodgkin disease (CMS/HCC)     RELAPSED   • Hypothyroidism    • Lymphoma (CMS/HCC)    • Menopause    • Ovarian failure due to cancer therapy        Past Surgical History:   Procedure Laterality Date   • HERNIA REPAIR      PT WAS 3 YO       Social History     Tobacco Use   Smoking Status Never Smoker       has a current medication list which includes the following prescription(s): levothyroxine.  ________________________________________    Current contraception: none  History of abnormal Pap smear: no  Family history of Breast cancer: no  Family history of uterine or ovarian cancer: no  Family History of colon cancer/colon polyps: no  History of abnormal mammogram:  "no      The following portions of the patient's history were reviewed and updated as appropriate: allergies, current medications, past family history, past medical history, past social history, past surgical history and problem list.    Review of Systems    Pertinent items are noted in HPI.     Objective   Physical Exam    /66   Ht 180.3 cm (71\")   Wt 79.4 kg (175 lb)   BMI 24.41 kg/m²    BP Readings from Last 3 Encounters:   01/07/19 112/66   10/17/17 112/78   04/18/17 100/70      Wt Readings from Last 3 Encounters:   01/07/19 79.4 kg (175 lb)   10/17/17 89.2 kg (196 lb 9.6 oz)   04/18/17 85.7 kg (189 lb)      BMI: Estimated body mass index is 24.41 kg/m² as calculated from the following:    Height as of this encounter: 180.3 cm (71\").    Weight as of this encounter: 79.4 kg (175 lb).      General:   alert, appears stated age and cooperative   Abdomen: soft, non-tender, without masses or organomegaly   Breast: inspection negative, no nipple discharge or bleeding, no masses or nodularity palpable   Vulva: normal   Vagina: normal mucosa   Cervix: no cervical motion tenderness and no lesions   Uterus: normal size, mobile or non-tender   Adnexa: no mass, fullness, tenderness     Assessment:    1. Normal annual exam   Assessment     ICD-10-CM ICD-9-CM   1. Encounter for gynecological examination without abnormal finding Z01.419 V72.31   2. Screening for cervical cancer Z12.4 V76.2   3. Screening for HPV (human papillomavirus) Z11.51 V73.81   4. Screening for breast cancer Z12.31 V76.10     Plan:    Plan     [x]  Mammogram request made  [x]  PAP done  []  Labs:   []  GC/Chl/TV  []  DEXA scan   []  Referral for colonoscopy:       Linda was seen today for annual exam.    Diagnoses and all orders for this visit:    Encounter for gynecological examination without abnormal finding  -     IGP, Aptima HPV, Rfx 16 / 18,45    Screening for cervical cancer  -     IGP, Aptima HPV, Rfx 16 / 18,45    Screening for HPV " (human papillomavirus)  -     IGP, Aptima HPV, Rfx 16 / 18,45    Screening for breast cancer  -     Mammo Screening Bilateral With CAD; Future            Counseling:  --Nutrition: Stressed importance of moderation and caloric balance, stressed fresh fruit and vegetables  --Exercise: Stressed the importance of regular exercise. 3-5 times weekly       --Discussed pap smear screening recommendations

## 2019-01-09 ENCOUNTER — DOCUMENTATION (OUTPATIENT)
Dept: ONCOLOGY | Facility: CLINIC | Age: 39
End: 2019-01-09

## 2019-01-09 NOTE — PROGRESS NOTES
To Whom It May Concern,    Linda Serrato (date of birth 1980) was treated in our practice for Hodgkin's disease.  She required chemotherapy, radiation therapy and high-dose chemotherapy with stem cell transplant.    She has suffered some late toxicities from her treatment including hypothyroidism with associated weight gain and very stiff muscles from her prior radiation treatments.    We agree that her soft tissue massages and enrollment in weight watchers to control her weight to a healthy level are medically indicated in this setting.  We feel that using her health savings account dollars on these modalities is entirely justified.    Your consideration in this matter is appreciated.        Sincerely,    Marshal Bhatt M.D.

## 2019-01-11 LAB
CYTOLOGIST CVX/VAG CYTO: NORMAL
CYTOLOGY CVX/VAG DOC THIN PREP: NORMAL
DX ICD CODE: NORMAL
HIV 1 & 2 AB SER-IMP: NORMAL
HPV I/H RISK 4 DNA CVX QL PROBE+SIG AMP: NEGATIVE
Lab: NORMAL
OTHER STN SPEC: NORMAL
PATH REPORT.FINAL DX SPEC: NORMAL
STAT OF ADQ CVX/VAG CYTO-IMP: NORMAL

## 2019-01-14 ENCOUNTER — TELEPHONE (OUTPATIENT)
Dept: OBSTETRICS AND GYNECOLOGY | Age: 39
End: 2019-01-14

## 2019-07-01 ENCOUNTER — TELEPHONE (OUTPATIENT)
Dept: ONCOLOGY | Facility: HOSPITAL | Age: 39
End: 2019-07-01

## 2019-07-01 DIAGNOSIS — C81.21 MIXED CELLULARITY HODGKIN LYMPHOMA OF LYMPH NODES OF NECK (HCC): ICD-10-CM

## 2019-07-01 DIAGNOSIS — E89.40 OVARIAN FAILURE DUE TO CANCER THERAPY: ICD-10-CM

## 2019-07-01 DIAGNOSIS — E03.9 HYPOTHYROIDISM, ACQUIRED: ICD-10-CM

## 2019-07-01 RX ORDER — LEVOTHYROXINE SODIUM 175 UG/1
175 TABLET ORAL DAILY
Qty: 30 TABLET | Refills: 2 | Status: SHIPPED | OUTPATIENT
Start: 2019-07-01 | End: 2020-01-06

## 2019-07-01 NOTE — TELEPHONE ENCOUNTER
----- Message from Camila Morrison sent at 7/1/2019  8:36 AM EDT -----  [phone]    Changing pharmacy new one is  [address]  [phone]    Needs levothyroxine  150 she thinks.    Call her when it's been done    Thanks

## 2020-01-03 DIAGNOSIS — E03.9 HYPOTHYROIDISM, ACQUIRED: ICD-10-CM

## 2020-01-03 DIAGNOSIS — C81.21 MIXED CELLULARITY HODGKIN LYMPHOMA OF LYMPH NODES OF NECK (HCC): ICD-10-CM

## 2020-01-03 DIAGNOSIS — E89.40 OVARIAN FAILURE DUE TO CANCER THERAPY: ICD-10-CM

## 2020-01-06 RX ORDER — LEVOTHYROXINE SODIUM 175 UG/1
TABLET ORAL
Qty: 30 TABLET | Refills: 0 | Status: SHIPPED | OUTPATIENT
Start: 2020-01-06 | End: 2020-02-27

## 2020-01-20 ENCOUNTER — LAB (OUTPATIENT)
Dept: LAB | Facility: HOSPITAL | Age: 40
End: 2020-01-20

## 2020-01-20 DIAGNOSIS — E03.9 HYPOTHYROIDISM, ACQUIRED: Primary | ICD-10-CM

## 2020-01-20 LAB
ALBUMIN SERPL-MCNC: 4.3 G/DL (ref 3.5–5.2)
ALBUMIN/GLOB SERPL: 1.8 G/DL (ref 1.1–2.4)
ALP SERPL-CCNC: 51 U/L (ref 38–116)
ALT SERPL W P-5'-P-CCNC: 20 U/L (ref 0–33)
ANION GAP SERPL CALCULATED.3IONS-SCNC: 10.3 MMOL/L (ref 5–15)
AST SERPL-CCNC: 23 U/L (ref 0–32)
BASOPHILS # BLD AUTO: 0.02 10*3/MM3 (ref 0–0.2)
BASOPHILS NFR BLD AUTO: 0.4 % (ref 0–1.5)
BILIRUB SERPL-MCNC: 0.4 MG/DL (ref 0.2–1.2)
BUN BLD-MCNC: 10 MG/DL (ref 6–20)
BUN/CREAT SERPL: 11 (ref 7.3–30)
CALCIUM SPEC-SCNC: 9.4 MG/DL (ref 8.5–10.2)
CHLORIDE SERPL-SCNC: 103 MMOL/L (ref 98–107)
CO2 SERPL-SCNC: 27.7 MMOL/L (ref 22–29)
CREAT BLD-MCNC: 0.91 MG/DL (ref 0.6–1.1)
DEPRECATED RDW RBC AUTO: 43.2 FL (ref 37–54)
EOSINOPHIL # BLD AUTO: 0.04 10*3/MM3 (ref 0–0.4)
EOSINOPHIL NFR BLD AUTO: 0.7 % (ref 0.3–6.2)
ERYTHROCYTE [DISTWIDTH] IN BLOOD BY AUTOMATED COUNT: 12.6 % (ref 12.3–15.4)
GFR SERPL CREATININE-BSD FRML MDRD: 69 ML/MIN/1.73
GLOBULIN UR ELPH-MCNC: 2.4 GM/DL (ref 1.8–3.5)
GLUCOSE BLD-MCNC: 100 MG/DL (ref 74–124)
HCT VFR BLD AUTO: 38 % (ref 34–46.6)
HGB BLD-MCNC: 12.7 G/DL (ref 12–15.9)
IMM GRANULOCYTES # BLD AUTO: 0.01 10*3/MM3 (ref 0–0.05)
IMM GRANULOCYTES NFR BLD AUTO: 0.2 % (ref 0–0.5)
LYMPHOCYTES # BLD AUTO: 1.93 10*3/MM3 (ref 0.7–3.1)
LYMPHOCYTES NFR BLD AUTO: 34.7 % (ref 19.6–45.3)
MCH RBC QN AUTO: 31.2 PG (ref 26.6–33)
MCHC RBC AUTO-ENTMCNC: 33.4 G/DL (ref 31.5–35.7)
MCV RBC AUTO: 93.4 FL (ref 79–97)
MONOCYTES # BLD AUTO: 0.46 10*3/MM3 (ref 0.1–0.9)
MONOCYTES NFR BLD AUTO: 8.3 % (ref 5–12)
NEUTROPHILS # BLD AUTO: 3.1 10*3/MM3 (ref 1.7–7)
NEUTROPHILS NFR BLD AUTO: 55.7 % (ref 42.7–76)
NRBC BLD AUTO-RTO: 0 /100 WBC (ref 0–0.2)
PLATELET # BLD AUTO: 197 10*3/MM3 (ref 140–450)
PMV BLD AUTO: 8.1 FL (ref 6–12)
POTASSIUM BLD-SCNC: 4.2 MMOL/L (ref 3.5–4.7)
PROT SERPL-MCNC: 6.7 G/DL (ref 6.3–8)
RBC # BLD AUTO: 4.07 10*6/MM3 (ref 3.77–5.28)
SODIUM BLD-SCNC: 141 MMOL/L (ref 134–145)
T4 FREE SERPL-MCNC: 0.81 NG/DL (ref 0.93–1.7)
TSH SERPL DL<=0.05 MIU/L-ACNC: 14.3 UIU/ML (ref 0.27–4.2)
WBC NRBC COR # BLD: 5.56 10*3/MM3 (ref 3.4–10.8)

## 2020-01-20 PROCEDURE — 36415 COLL VENOUS BLD VENIPUNCTURE: CPT

## 2020-01-20 PROCEDURE — 85025 COMPLETE CBC W/AUTO DIFF WBC: CPT

## 2020-01-20 PROCEDURE — 84443 ASSAY THYROID STIM HORMONE: CPT | Performed by: INTERNAL MEDICINE

## 2020-01-20 PROCEDURE — 84439 ASSAY OF FREE THYROXINE: CPT | Performed by: INTERNAL MEDICINE

## 2020-01-20 PROCEDURE — 80053 COMPREHEN METABOLIC PANEL: CPT

## 2020-01-23 ENCOUNTER — TELEPHONE (OUTPATIENT)
Dept: ONCOLOGY | Facility: CLINIC | Age: 40
End: 2020-01-23

## 2020-01-23 ENCOUNTER — TELEPHONE (OUTPATIENT)
Dept: ONCOLOGY | Facility: HOSPITAL | Age: 40
End: 2020-01-23

## 2020-01-23 NOTE — TELEPHONE ENCOUNTER
DR. GUTIERREZ HAD SAID THAT PT COULD CANCEL APPT NEXT WK. APPT DESK HAD ALREADY BEEN NOTIFIED PER DR. GUTIERREZ AND HAD CANCELED NEXT WK AND HAD PT RESCHEDULED FOR FEB W/ LABS DONE 1 WK PRIOR. PT DOESN'T WANT TO COME IN NEXT WK TO HAVE LABS REPEATED ANYMORE.

## 2020-01-23 NOTE — TELEPHONE ENCOUNTER
----- Message from Marshal Bhatt MD sent at 1/23/2020  1:21 PM EST -----  Regarding: RE: PT WANTING TO CANCEL MD VS MONDAY IF OK   That is OK with me. She needs to have lab with the next appt ( CBC,CMP, TSH,T4 )  ----- Message -----  From: Nina Salgado RN  Sent: 1/23/2020  11:33 AM EST  To: Marshal Bhatt MD  Subject: PT WANTING TO CANCEL MD VS MONDAY IF OK          PT HAS APPT TO SEE YOU Monday (NO LABS). PT WOULD LIKE TO GET LABS RECHECKED NEXT WK (TSH, T4). THEN SEE YOU MID FEB IF OK W/ YOU. PT HAD HER MEDS STOLEN WHILE ON VACATION AND WAS OFF MEDS WHEN LABS CHECKED (REASON THEY WERE OFF). SHE WANTS TO GET HER ROUTINE BACK AND THEN F/U W/ YOU. OK?

## 2020-01-27 ENCOUNTER — APPOINTMENT (OUTPATIENT)
Dept: LAB | Facility: HOSPITAL | Age: 40
End: 2020-01-27

## 2020-01-29 ENCOUNTER — OFFICE VISIT (OUTPATIENT)
Dept: OBSTETRICS AND GYNECOLOGY | Age: 40
End: 2020-01-29

## 2020-01-29 ENCOUNTER — APPOINTMENT (OUTPATIENT)
Dept: WOMENS IMAGING | Facility: HOSPITAL | Age: 40
End: 2020-01-29

## 2020-01-29 ENCOUNTER — PROCEDURE VISIT (OUTPATIENT)
Dept: OBSTETRICS AND GYNECOLOGY | Age: 40
End: 2020-01-29

## 2020-01-29 VITALS
HEIGHT: 71 IN | BODY MASS INDEX: 29.68 KG/M2 | DIASTOLIC BLOOD PRESSURE: 62 MMHG | SYSTOLIC BLOOD PRESSURE: 102 MMHG | WEIGHT: 212 LBS

## 2020-01-29 DIAGNOSIS — Z12.31 VISIT FOR SCREENING MAMMOGRAM: Primary | ICD-10-CM

## 2020-01-29 DIAGNOSIS — Z01.419 ENCOUNTER FOR GYNECOLOGICAL EXAMINATION (GENERAL) (ROUTINE) WITHOUT ABNORMAL FINDINGS: Primary | ICD-10-CM

## 2020-01-29 PROCEDURE — 77067 SCR MAMMO BI INCL CAD: CPT | Performed by: RADIOLOGY

## 2020-01-29 PROCEDURE — 77067 SCR MAMMO BI INCL CAD: CPT | Performed by: OBSTETRICS & GYNECOLOGY

## 2020-01-29 PROCEDURE — 99395 PREV VISIT EST AGE 18-39: CPT | Performed by: OBSTETRICS & GYNECOLOGY

## 2020-01-29 NOTE — PROGRESS NOTES
Routine Annual Visit    2020    Patient: Linda Marrufo          MR#:[id number]      Chief Complaint   Patient presents with   • Gynecologic Exam     AE AND MG BEFORE EXAM TODAY. 2019 PAP = NEG/NEG. CONTRACEPTION = NONE.  NO COMPLAINTS. NO CYCLES.  PERSONAL HX OF CANCER.        History of Present Illness    39 y.o. female  who presents for annual exam.   Doing well, no menses and no menopausal symptoms  Not dating  mammo done and plan yearly for history of chest radiation  UTD pap          No LMP recorded. (Menstrual status: Other).  Obstetric History:  OB History        0    Para   0    Term   0       0    AB   0    Living   0       SAB   0    TAB   0    Ectopic   0    Molar        Multiple   0    Live Births                   Menstrual History:     No LMP recorded. (Menstrual status: Other).       Sexual History:       ________________________________________  Patient Active Problem List   Diagnosis   • Hodgkin's disease (CMS/HCC)   • Ovarian failure due to cancer therapy   • Hypothyroidism, acquired       Past Medical History:   Diagnosis Date   • HA (headache)    • History of ovarian cyst    • History of weight gain    • Hodgkin disease (CMS/HCC)     RELAPSED   • Hypothyroidism    • Lymphoma (CMS/HCC)    • Menopause    • Ovarian failure due to cancer therapy        Past Surgical History:   Procedure Laterality Date   • HERNIA REPAIR      PT WAS 3 YO   • OTHER SURGICAL HISTORY Left     Insertion trifusion cath       Social History     Tobacco Use   Smoking Status Never Smoker   Smokeless Tobacco Never Used       has a current medication list which includes the following prescription(s): levothyroxine.  ________________________________________    Current contraception: abstinence  History of abnormal Pap smear: no  Family history of Breast cancer: no        The following portions of the patient's history were reviewed and updated as appropriate: allergies, current  "medications, past family history, past medical history, past social history, past surgical history and problem list.    Review of Systems    Pertinent items are noted in HPI.     Objective   Physical Exam    Ht 180.3 cm (71\")   Wt 96.2 kg (212 lb)   Breastfeeding No   BMI 29.57 kg/m²    BP Readings from Last 3 Encounters:   01/07/19 112/66   10/17/17 112/78   04/18/17 100/70      Wt Readings from Last 3 Encounters:   01/29/20 96.2 kg (212 lb)   01/07/19 79.4 kg (175 lb)   10/17/17 89.2 kg (196 lb 9.6 oz)      BMI: Estimated body mass index is 29.57 kg/m² as calculated from the following:    Height as of this encounter: 180.3 cm (71\").    Weight as of this encounter: 96.2 kg (212 lb).      General:   alert, appears stated age and cooperative   Abdomen: soft, non-tender, without masses or organomegaly   Breast: inspection negative, no nipple discharge or bleeding, no masses or nodularity palpable   Vulva: normal   Vagina: normal mucosa   Cervix: no cervical motion tenderness and no lesions   Uterus: normal size, mobile or non-tender   Adnexa: no mass, fullness, tenderness     Assessment:    1. Normal annual exam   Assessment     ICD-10-CM ICD-9-CM   1. Encounter for gynecological examination (general) (routine) without abnormal findings Z01.419 V72.31     Plan:    Plan       []  PAP done  []  Labs:   []  GC/Chl/TV          Linda was seen today for gynecologic exam.    Diagnoses and all orders for this visit:    Encounter for gynecological examination (general) (routine) without abnormal findings    mammo done today        Counseling:  --Nutrition: Stressed importance of moderation and caloric balance, stressed fresh fruit and vegetables  --Exercise: Stressed the importance of regular exercise. 3-5 times weekly       --Discussed pap smear screening recommendations    "

## 2020-02-20 ENCOUNTER — APPOINTMENT (OUTPATIENT)
Dept: LAB | Facility: HOSPITAL | Age: 40
End: 2020-02-20

## 2020-02-21 ENCOUNTER — LAB (OUTPATIENT)
Dept: LAB | Facility: HOSPITAL | Age: 40
End: 2020-02-21

## 2020-02-21 DIAGNOSIS — E03.9 HYPOTHYROIDISM, ACQUIRED: Primary | ICD-10-CM

## 2020-02-21 LAB
ALBUMIN SERPL-MCNC: 4.2 G/DL (ref 3.5–5.2)
ALBUMIN/GLOB SERPL: 1.8 G/DL (ref 1.1–2.4)
ALP SERPL-CCNC: 54 U/L (ref 38–116)
ALT SERPL W P-5'-P-CCNC: 21 U/L (ref 0–33)
ANION GAP SERPL CALCULATED.3IONS-SCNC: 12.2 MMOL/L (ref 5–15)
AST SERPL-CCNC: 19 U/L (ref 0–32)
BASOPHILS # BLD AUTO: 0.02 10*3/MM3 (ref 0–0.2)
BASOPHILS NFR BLD AUTO: 0.4 % (ref 0–1.5)
BILIRUB SERPL-MCNC: 0.2 MG/DL (ref 0.2–1.2)
BUN BLD-MCNC: 9 MG/DL (ref 6–20)
BUN/CREAT SERPL: 12 (ref 7.3–30)
CALCIUM SPEC-SCNC: 9.4 MG/DL (ref 8.5–10.2)
CHLORIDE SERPL-SCNC: 104 MMOL/L (ref 98–107)
CO2 SERPL-SCNC: 25.8 MMOL/L (ref 22–29)
CREAT BLD-MCNC: 0.75 MG/DL (ref 0.6–1.1)
DEPRECATED RDW RBC AUTO: 39.6 FL (ref 37–54)
EOSINOPHIL # BLD AUTO: 0.04 10*3/MM3 (ref 0–0.4)
EOSINOPHIL NFR BLD AUTO: 0.9 % (ref 0.3–6.2)
ERYTHROCYTE [DISTWIDTH] IN BLOOD BY AUTOMATED COUNT: 12 % (ref 12.3–15.4)
GFR SERPL CREATININE-BSD FRML MDRD: 86 ML/MIN/1.73
GLOBULIN UR ELPH-MCNC: 2.4 GM/DL (ref 1.8–3.5)
GLUCOSE BLD-MCNC: 98 MG/DL (ref 74–124)
HCT VFR BLD AUTO: 37.6 % (ref 34–46.6)
HGB BLD-MCNC: 12.8 G/DL (ref 12–15.9)
IMM GRANULOCYTES # BLD AUTO: 0.01 10*3/MM3 (ref 0–0.05)
IMM GRANULOCYTES NFR BLD AUTO: 0.2 % (ref 0–0.5)
LYMPHOCYTES # BLD AUTO: 1.99 10*3/MM3 (ref 0.7–3.1)
LYMPHOCYTES NFR BLD AUTO: 44.4 % (ref 19.6–45.3)
MCH RBC QN AUTO: 30.7 PG (ref 26.6–33)
MCHC RBC AUTO-ENTMCNC: 34 G/DL (ref 31.5–35.7)
MCV RBC AUTO: 90.2 FL (ref 79–97)
MONOCYTES # BLD AUTO: 0.52 10*3/MM3 (ref 0.1–0.9)
MONOCYTES NFR BLD AUTO: 11.6 % (ref 5–12)
NEUTROPHILS # BLD AUTO: 1.9 10*3/MM3 (ref 1.7–7)
NEUTROPHILS NFR BLD AUTO: 42.5 % (ref 42.7–76)
NRBC BLD AUTO-RTO: 0 /100 WBC (ref 0–0.2)
PLATELET # BLD AUTO: 197 10*3/MM3 (ref 140–450)
PMV BLD AUTO: 7.8 FL (ref 6–12)
POTASSIUM BLD-SCNC: 4 MMOL/L (ref 3.5–4.7)
PROT SERPL-MCNC: 6.6 G/DL (ref 6.3–8)
RBC # BLD AUTO: 4.17 10*6/MM3 (ref 3.77–5.28)
SODIUM BLD-SCNC: 142 MMOL/L (ref 134–145)
T4 SERPL-MCNC: 10.2 MCG/DL (ref 4.5–11.7)
TSH SERPL DL<=0.05 MIU/L-ACNC: 0.04 UIU/ML (ref 0.27–4.2)
WBC NRBC COR # BLD: 4.48 10*3/MM3 (ref 3.4–10.8)

## 2020-02-21 PROCEDURE — 80053 COMPREHEN METABOLIC PANEL: CPT

## 2020-02-21 PROCEDURE — 36415 COLL VENOUS BLD VENIPUNCTURE: CPT

## 2020-02-21 PROCEDURE — 85025 COMPLETE CBC W/AUTO DIFF WBC: CPT

## 2020-02-21 PROCEDURE — 84436 ASSAY OF TOTAL THYROXINE: CPT | Performed by: INTERNAL MEDICINE

## 2020-02-21 PROCEDURE — 84443 ASSAY THYROID STIM HORMONE: CPT | Performed by: INTERNAL MEDICINE

## 2020-02-24 ENCOUNTER — TELEPHONE (OUTPATIENT)
Dept: ONCOLOGY | Facility: CLINIC | Age: 40
End: 2020-02-24

## 2020-02-24 DIAGNOSIS — E03.9 HYPOTHYROIDISM, ACQUIRED: ICD-10-CM

## 2020-02-24 DIAGNOSIS — C81.21 MIXED CELLULARITY HODGKIN LYMPHOMA OF LYMPH NODES OF NECK (HCC): ICD-10-CM

## 2020-02-24 DIAGNOSIS — E89.40 OVARIAN FAILURE DUE TO CANCER THERAPY: ICD-10-CM

## 2020-02-24 RX ORDER — LEVOTHYROXINE SODIUM 0.12 MG/1
125 TABLET ORAL DAILY
Qty: 10 TABLET | Refills: 0 | Status: SHIPPED | OUTPATIENT
Start: 2020-02-24 | End: 2020-02-27 | Stop reason: SDUPTHER

## 2020-02-24 NOTE — TELEPHONE ENCOUNTER
Pt called stating that she needs a refill on her synthroid. She is currently taking 175mcg. Her TSH was 0.036 last week. A month prior to this, her TSH was 14.3. She stopped taking biotin a month ago because her TSH was elevated but now is unsure of what the dosing should be. Pt will see Dr. Bhatt this week but she is currently completely out of medication. Message sent to Dr. Bhatt asking what dose she should be on.

## 2020-02-24 NOTE — TELEPHONE ENCOUNTER
Marshal Bhatt     Pt called she needs a medication refill: levothyroxine (SYNTHROID, LEVOTHROID) 175 MCG tablet     Pt  Wants Dr Bhatt's nurse to call her asap please @ [phone]    Pt has some questions and concerns     Thanks

## 2020-02-24 NOTE — TELEPHONE ENCOUNTER
Called pt and informed her. She v/u. Escribed #10 to her pharmacy, enough to get her to her apt on Thursday.       ----- Message from Marshal Bhatt MD sent at 2/24/2020  1:30 PM EST -----  Decrease to 125 mcg daily.  ----- Message -----  From: Flaquita Thomas RN  Sent: 2/24/2020   9:47 AM EST  To: MD Dr. Miller Villar,  Pt called concerned about her thyroid levels. You see her on 2/27. She needs a refill on her synthroid before you see her. She had labs done last week and her TSH was 0.036, T4 10.20. A month ago her TSH was 14.3. She was taking biotin when she had it checked a month ago and she has since stopped taking it. What dosing should we put her on now? Please advise. Thank you

## 2020-02-27 ENCOUNTER — APPOINTMENT (OUTPATIENT)
Dept: LAB | Facility: HOSPITAL | Age: 40
End: 2020-02-27

## 2020-02-27 ENCOUNTER — OFFICE VISIT (OUTPATIENT)
Dept: ONCOLOGY | Facility: CLINIC | Age: 40
End: 2020-02-27

## 2020-02-27 VITALS
DIASTOLIC BLOOD PRESSURE: 71 MMHG | SYSTOLIC BLOOD PRESSURE: 108 MMHG | HEIGHT: 71 IN | WEIGHT: 214.8 LBS | OXYGEN SATURATION: 99 % | RESPIRATION RATE: 18 BRPM | TEMPERATURE: 98.6 F | HEART RATE: 83 BPM | BODY MASS INDEX: 30.07 KG/M2

## 2020-02-27 DIAGNOSIS — E03.9 HYPOTHYROIDISM, ACQUIRED: ICD-10-CM

## 2020-02-27 DIAGNOSIS — C81.21 MIXED CELLULARITY HODGKIN LYMPHOMA OF LYMPH NODES OF NECK (HCC): Primary | ICD-10-CM

## 2020-02-27 DIAGNOSIS — E89.40 OVARIAN FAILURE DUE TO CANCER THERAPY: ICD-10-CM

## 2020-02-27 PROCEDURE — 99214 OFFICE O/P EST MOD 30 MIN: CPT | Performed by: INTERNAL MEDICINE

## 2020-02-27 RX ORDER — LEVOTHYROXINE SODIUM 0.12 MG/1
125 TABLET ORAL DAILY
Qty: 90 TABLET | Refills: 3 | Status: SHIPPED | OUTPATIENT
Start: 2020-02-27 | End: 2020-05-26

## 2020-02-27 NOTE — PROGRESS NOTES
Subjective   REASONS FOR FOLLOWUP:     1. Relapsed Hodgkin disease, now status post high dose therapy and autologous stem cell rescue at [place] on 01/28/2011.   2. Radiation therapy to the left neck and supraclavicular area at [facility] with Dr. Wilkerson completed 04/28/2011.   3. Ovarian failure following chemotherapy currently on hormone replacement and following up with Dr. Concetta Duncan, OB-GYN.   4. Hypothyroidism on Synthroid replacement.     HISTORY OF PRESENT ILLNESS:   Linda is a 39 y.o. female with the above-noted history of recurrent Hodgkin disease treated with high-dose therapy and stem cell transplant at [facility] in January 2011. Following her transplant, she also received radiation therapy to the left neck and supraclavicular area which she completed in April 2011.    She was seen at the [place] bone marrow transplant clinic in February 2016 and at that point was 5 years out from her transplant and was released from further follow-up in [place].       She is here today for routine  followup and seems to be doing just fine.      She had her mammogram performed 1/29/2020 with no suspicious findings in either breast.    Her last TSH level was very low and her T4 was at the high end of normal.  We have decreased her Synthroid dose from 175 down to 125 and will check another set of labs in 3 months.     History of Present Illness      Past Medical History, Past Surgical History, Social History, Family History have been reviewed and are without significant changes except as mentioned.    Review of Systems   Constitutional: Negative for activity change, appetite change, fatigue, fever and unexpected weight change.   HENT: Negative for hearing loss, nosebleeds, trouble swallowing and voice change.    Eyes: Negative for visual disturbance.   Respiratory: Negative for cough, shortness of breath and wheezing.    Cardiovascular: Negative for chest pain and  "palpitations.   Gastrointestinal: Negative for abdominal pain, diarrhea, nausea and vomiting.   Genitourinary: Negative for difficulty urinating, frequency, hematuria and urgency.   Musculoskeletal: Negative for back pain and neck pain.   Skin: Negative for rash.   Neurological: Negative for dizziness, seizures, syncope and headaches.   Hematological: Negative for adenopathy. Does not bruise/bleed easily.   Psychiatric/Behavioral: Negative for behavioral problems. The patient is not nervous/anxious.       A comprehensive 14 point review of systems was performed and was negative except as mentioned.    Medications:  The current medication list was reviewed in the EMR    ALLERGIES:  No Known Allergies    Objective      Vitals:    02/27/20 1657   BP: 108/71   Pulse: 83   Resp: 18   Temp: 98.6 °F (37 °C)   TempSrc: Oral   SpO2: 99%   Weight: 97.4 kg (214 lb 12.8 oz)   Height: 180.3 cm (70.98\")  Comment: neew yearly height   PainSc: 0-No pain     Current Status 2/27/2020   ECOG score 0       Physical Exam   Constitutional: She is oriented to person, place, and time. She appears well-developed and well-nourished. No distress.   HENT:   Head: Normocephalic.   Eyes: Pupils are equal, round, and reactive to light. Conjunctivae and EOM are normal. No scleral icterus.   Neck: Normal range of motion. Neck supple. No JVD present. No thyromegaly present.   Cardiovascular: Normal rate and regular rhythm. Exam reveals no gallop and no friction rub.   No murmur heard.  Pulmonary/Chest: Effort normal and breath sounds normal. She has no wheezes. She has no rales.   Abdominal: Soft. She exhibits no distension and no mass. There is no tenderness.   Musculoskeletal: Normal range of motion. She exhibits no edema or deformity.   Lymphadenopathy:     She has no cervical adenopathy.   Neurological: She is alert and oriented to person, place, and time. She has normal reflexes. No cranial nerve deficit.   Skin: Skin is warm and dry. No rash " noted. No erythema.   Psychiatric: She has a normal mood and affect. Her behavior is normal. Judgment normal.         RECENT LABS:  Hematology WBC   Date Value Ref Range Status   02/21/2020 4.48 3.40 - 10.80 10*3/mm3 Final     RBC   Date Value Ref Range Status   02/21/2020 4.17 3.77 - 5.28 10*6/mm3 Final     Hemoglobin   Date Value Ref Range Status   02/21/2020 12.8 12.0 - 15.9 g/dL Final     Hematocrit   Date Value Ref Range Status   02/21/2020 37.6 34.0 - 46.6 % Final     Platelets   Date Value Ref Range Status   02/21/2020 197 140 - 450 10*3/mm3 Final            Lab Results   Component Value Date    TSH 0.036 (L) 02/21/2020       Assessment/Plan   ASSESSMENT:   1. Hodgkin disease in remission following high dose therapy and autologous stem cell transplant at [place] in late January 2011. The patient continues to do well with no evidence of disease, now over 9 years out from her transplant.   2. Post-transplant consolidation radiation left neck and supraclavicular area completed 04/28/2011.   3. Ovarian failure following high-dose chemotherapy. The patient continues to followup with her OB/GYN, Dr. Concetta Duncan.                   4. Hypothyroidism, currently on Synthroid replacement.  Her most  recent labs have indicated that her dose of Synthroid may be too high and her Synthroid dose subsequently been adjusted from 175 mcg down to 125 mcg daily.  PLAN:   1. At this point, we feel Priscilla can follow-up in our office on an annual basis with repeat labs drawn 1 week prior to visit including CBC, serum chemistries, and thyroid studies.  2. We discussed when to start breast cancer screening and colon cancer screening. The latest recommendations indicate breast cancer screening 8-10 years after treatment or at age 40. Since her initial chest irradiation was in 2010 she started breast imaging in 2019 when she was 38 and she will need to start screening colonoscopies at age 40.   3. Because of the recent change in her  Synthroid dosing we will check another TSH and T4 in 3 months.                  2/27/2020      CC:

## 2020-05-19 ENCOUNTER — TELEPHONE (OUTPATIENT)
Dept: ONCOLOGY | Facility: CLINIC | Age: 40
End: 2020-05-19

## 2020-05-19 NOTE — TELEPHONE ENCOUNTER
PT WOULD LIKE TO RESCHEDULE THE TIME OF HER LAB ON 5/21/20 TO 4:30 OR AFTER.  IF NOTHING AVAILABLE THAT DAY, COULD SHE RESCHEDULE TO 4:30 OR AFTER ANY OTHER DAY.    PLEASE LEAVE A DETAILED MESSAGE.  [phone]

## 2020-05-21 ENCOUNTER — LAB (OUTPATIENT)
Dept: LAB | Facility: HOSPITAL | Age: 40
End: 2020-05-21

## 2020-05-21 ENCOUNTER — APPOINTMENT (OUTPATIENT)
Dept: LAB | Facility: HOSPITAL | Age: 40
End: 2020-05-21

## 2020-05-21 DIAGNOSIS — C81.21 MIXED CELLULARITY HODGKIN LYMPHOMA OF LYMPH NODES OF NECK (HCC): ICD-10-CM

## 2020-05-21 DIAGNOSIS — E89.40 OVARIAN FAILURE DUE TO CANCER THERAPY: ICD-10-CM

## 2020-05-21 DIAGNOSIS — E03.9 HYPOTHYROIDISM, ACQUIRED: ICD-10-CM

## 2020-05-21 LAB
T4 FREE SERPL-MCNC: 1.71 NG/DL (ref 0.93–1.7)
TSH SERPL DL<=0.05 MIU/L-ACNC: 0.01 UIU/ML (ref 0.27–4.2)

## 2020-05-21 PROCEDURE — 36415 COLL VENOUS BLD VENIPUNCTURE: CPT

## 2020-05-21 PROCEDURE — 84443 ASSAY THYROID STIM HORMONE: CPT | Performed by: INTERNAL MEDICINE

## 2020-05-21 PROCEDURE — 84439 ASSAY OF FREE THYROXINE: CPT | Performed by: INTERNAL MEDICINE

## 2020-05-26 ENCOUNTER — TELEPHONE (OUTPATIENT)
Dept: ONCOLOGY | Facility: CLINIC | Age: 40
End: 2020-05-26

## 2020-05-26 ENCOUNTER — TELEPHONE (OUTPATIENT)
Dept: ONCOLOGY | Facility: HOSPITAL | Age: 40
End: 2020-05-26

## 2020-05-26 DIAGNOSIS — E03.9 HYPOTHYROIDISM, ACQUIRED: Primary | ICD-10-CM

## 2020-05-26 RX ORDER — LEVOTHYROXINE SODIUM 0.1 MG/1
100 TABLET ORAL
Qty: 90 TABLET | Refills: 3 | Status: SHIPPED | OUTPATIENT
Start: 2020-05-26 | End: 2020-12-21 | Stop reason: SDUPTHER

## 2020-05-26 NOTE — TELEPHONE ENCOUNTER
----- Message from Marshal Bhatt MD sent at 5/26/2020 11:17 AM EDT -----  Regarding: FW: Prescription Question  Contact: [phone]  Please decrease her levothyroxine dose to 100 mcg daily and check repeat TSH and free T4 in 3 months.  ----- Message -----  From: Nina Salgado RN  Sent: 5/26/2020  10:40 AM EDT  To: Marshal Bhatt MD  Subject: FW: Prescription Question                        LET TRIAGE KNOW WHAT TO DO AND WE CAN TAKE CARE OF THIS FOR YOU!  SEE BELOW.   ----- Message -----  From: Linda Marrufo  Sent: 5/26/2020   9:02 AM EDT  To: [facility]  Subject: Prescription Question                            HI!    I had blood drawn last Thursday and my TSH levels are now lower than they were last time when we reduced medication.  The only thing I have changed is not taking Biotin and running every day.      Please let me know if I should stop taking my medication or if I need to come in again.    Thank you,  Kayla

## 2020-05-26 NOTE — TELEPHONE ENCOUNTER
----- Message from Nina Salgado, RN sent at 5/26/2020 11:23 AM EDT -----  Regarding: APPT NEEDED IN 3 MONTHS   PT NEEDS APPT IN 3 MONTHS FOR LAB (TSH, FREE T4). NO RN REV NEEDED. PT TO CALL FOR RESULTS. THANKS!

## 2020-05-26 NOTE — TELEPHONE ENCOUNTER
PT SENT EMAIL ABOUT LAB RESULTS AND LEVOTHYROXINE DOSE. PER DR. GUTIERREZ PT TO DEC DOSE TO 100MCG DAILY AND RECHECK TSH AND FREE T4 IN 3 MONTHS. LAB ORDERS PLACED AND APPT DESK MESSAGED. NEW RX E-SCRIBED AS WELL. L/M FOR PT W/ DETAILS AND ALSO SENT HER A REPLY EMAIL.

## 2020-08-18 ENCOUNTER — LAB (OUTPATIENT)
Dept: LAB | Facility: HOSPITAL | Age: 40
End: 2020-08-18

## 2020-08-18 DIAGNOSIS — E03.9 HYPOTHYROIDISM, ACQUIRED: ICD-10-CM

## 2020-08-18 LAB
T4 FREE SERPL-MCNC: 1.33 NG/DL (ref 0.93–1.7)
TSH SERPL DL<=0.05 MIU/L-ACNC: 1.39 UIU/ML (ref 0.27–4.2)

## 2020-08-18 PROCEDURE — 84439 ASSAY OF FREE THYROXINE: CPT | Performed by: INTERNAL MEDICINE

## 2020-08-18 PROCEDURE — 84443 ASSAY THYROID STIM HORMONE: CPT | Performed by: INTERNAL MEDICINE

## 2020-12-21 RX ORDER — LEVOTHYROXINE SODIUM 0.1 MG/1
100 TABLET ORAL
Qty: 90 TABLET | Refills: 0 | Status: SHIPPED | OUTPATIENT
Start: 2020-12-21 | End: 2021-03-01

## 2020-12-21 NOTE — TELEPHONE ENCOUNTER
Caller: Linda    Relationship: Pt    Best call back number: [phone]    Medication needed:   Requested Prescriptions     Pending Prescriptions Disp Refills   • levothyroxine (SYNTHROID, LEVOTHROID) 100 MCG tablet 90 tablet 3     Sig: Take 1 tablet by mouth Every Morning Before Breakfast.       When do you need the refill by: Today    What details did the patient provide when requesting the medication: Completely out of meds    Does the patient have less than a 3 day supply:  [x] Yes  [] No    What is the patient's preferred pharmacy:    [id number] [place] [address] [facility] [address] [facility]  - [phone]  - [phone] FX

## 2021-01-07 ENCOUNTER — TELEPHONE (OUTPATIENT)
Dept: ONCOLOGY | Facility: CLINIC | Age: 41
End: 2021-01-07

## 2021-01-07 NOTE — TELEPHONE ENCOUNTER
Caller: PT    Relationship to patient: PT    Best call back number: [phone]    PT WOULD LIKE TO KNOW IF SHE IS CANDIDATE FOR COVID VACCINE.     PLEASE RETURN CALL

## 2021-01-07 NOTE — TELEPHONE ENCOUNTER
Patient has a unique opportunity to receive the Covid-19 vaccine and is asking if Dr. Bhatt recommends taking it. Her next appt is too far out to discuss before the opportunity is over. Message sent to Eufemia (clinic RN) to review w/ Dr. Bhatt when he returns to the office and pt will be notified. She v/u.

## 2021-01-08 ENCOUNTER — TELEPHONE (OUTPATIENT)
Dept: ONCOLOGY | Facility: CLINIC | Age: 41
End: 2021-01-08

## 2021-01-08 NOTE — TELEPHONE ENCOUNTER
Called the patient back to let her know that it was fine to take the COVID vaccine. V/M was left.

## 2021-02-10 ENCOUNTER — TELEPHONE (OUTPATIENT)
Dept: ONCOLOGY | Facility: CLINIC | Age: 41
End: 2021-02-10

## 2021-02-10 NOTE — TELEPHONE ENCOUNTER
PT: SELF    PT CALLING TO R/S HER 2/15 APPT SHE ASK THAT IT BE ON A Monday OR Tuesday.     PT #: [phone]

## 2021-02-15 ENCOUNTER — APPOINTMENT (OUTPATIENT)
Dept: LAB | Facility: HOSPITAL | Age: 41
End: 2021-02-15

## 2021-03-01 ENCOUNTER — LAB (OUTPATIENT)
Dept: LAB | Facility: HOSPITAL | Age: 41
End: 2021-03-01

## 2021-03-01 ENCOUNTER — OFFICE VISIT (OUTPATIENT)
Dept: ONCOLOGY | Facility: CLINIC | Age: 41
End: 2021-03-01

## 2021-03-01 VITALS
TEMPERATURE: 97.1 F | SYSTOLIC BLOOD PRESSURE: 112 MMHG | WEIGHT: 207 LBS | DIASTOLIC BLOOD PRESSURE: 78 MMHG | BODY MASS INDEX: 28.98 KG/M2 | HEART RATE: 87 BPM | HEIGHT: 71 IN | RESPIRATION RATE: 16 BRPM | OXYGEN SATURATION: 98 %

## 2021-03-01 DIAGNOSIS — E03.9 HYPOTHYROIDISM, ACQUIRED: Primary | ICD-10-CM

## 2021-03-01 DIAGNOSIS — C81.21 MIXED CELLULARITY HODGKIN LYMPHOMA OF LYMPH NODES OF NECK (HCC): Primary | ICD-10-CM

## 2021-03-01 DIAGNOSIS — E89.40 OVARIAN FAILURE DUE TO CANCER THERAPY: ICD-10-CM

## 2021-03-01 DIAGNOSIS — E03.9 HYPOTHYROIDISM, ACQUIRED: ICD-10-CM

## 2021-03-01 LAB
ALBUMIN SERPL-MCNC: 4.4 G/DL (ref 3.5–5.2)
ALBUMIN/GLOB SERPL: 1.7 G/DL (ref 1.1–2.4)
ALP SERPL-CCNC: 49 U/L (ref 38–116)
ALT SERPL W P-5'-P-CCNC: 16 U/L (ref 0–33)
ANION GAP SERPL CALCULATED.3IONS-SCNC: 10.4 MMOL/L (ref 5–15)
AST SERPL-CCNC: 22 U/L (ref 0–32)
BASOPHILS # BLD AUTO: 0.03 10*3/MM3 (ref 0–0.2)
BASOPHILS NFR BLD AUTO: 0.6 % (ref 0–1.5)
BILIRUB SERPL-MCNC: 0.5 MG/DL (ref 0.2–1.2)
BUN SERPL-MCNC: 13 MG/DL (ref 6–20)
BUN/CREAT SERPL: 14.6 (ref 7.3–30)
CALCIUM SPEC-SCNC: 10 MG/DL (ref 8.5–10.2)
CHLORIDE SERPL-SCNC: 105 MMOL/L (ref 98–107)
CO2 SERPL-SCNC: 26.6 MMOL/L (ref 22–29)
CREAT SERPL-MCNC: 0.89 MG/DL (ref 0.6–1.1)
DEPRECATED RDW RBC AUTO: 38.9 FL (ref 37–54)
EOSINOPHIL # BLD AUTO: 0.05 10*3/MM3 (ref 0–0.4)
EOSINOPHIL NFR BLD AUTO: 1.1 % (ref 0.3–6.2)
ERYTHROCYTE [DISTWIDTH] IN BLOOD BY AUTOMATED COUNT: 12 % (ref 12.3–15.4)
GFR SERPL CREATININE-BSD FRML MDRD: 70 ML/MIN/1.73
GLOBULIN UR ELPH-MCNC: 2.6 GM/DL (ref 1.8–3.5)
GLUCOSE SERPL-MCNC: 101 MG/DL (ref 74–124)
HCT VFR BLD AUTO: 38.5 % (ref 34–46.6)
HGB BLD-MCNC: 13.2 G/DL (ref 12–15.9)
IMM GRANULOCYTES # BLD AUTO: 0.01 10*3/MM3 (ref 0–0.05)
IMM GRANULOCYTES NFR BLD AUTO: 0.2 % (ref 0–0.5)
LYMPHOCYTES # BLD AUTO: 1.77 10*3/MM3 (ref 0.7–3.1)
LYMPHOCYTES NFR BLD AUTO: 37.6 % (ref 19.6–45.3)
MCH RBC QN AUTO: 30.8 PG (ref 26.6–33)
MCHC RBC AUTO-ENTMCNC: 34.3 G/DL (ref 31.5–35.7)
MCV RBC AUTO: 90 FL (ref 79–97)
MONOCYTES # BLD AUTO: 0.41 10*3/MM3 (ref 0.1–0.9)
MONOCYTES NFR BLD AUTO: 8.7 % (ref 5–12)
NEUTROPHILS NFR BLD AUTO: 2.44 10*3/MM3 (ref 1.7–7)
NEUTROPHILS NFR BLD AUTO: 51.8 % (ref 42.7–76)
NRBC BLD AUTO-RTO: 0 /100 WBC (ref 0–0.2)
PLATELET # BLD AUTO: 217 10*3/MM3 (ref 140–450)
PMV BLD AUTO: 8.2 FL (ref 6–12)
POTASSIUM SERPL-SCNC: 4.2 MMOL/L (ref 3.5–4.7)
PROT SERPL-MCNC: 7 G/DL (ref 6.3–8)
RBC # BLD AUTO: 4.28 10*6/MM3 (ref 3.77–5.28)
SODIUM SERPL-SCNC: 142 MMOL/L (ref 134–145)
T4 FREE SERPL-MCNC: 1.4 NG/DL (ref 0.93–1.7)
TSH SERPL DL<=0.05 MIU/L-ACNC: 0.64 UIU/ML (ref 0.27–4.2)
WBC # BLD AUTO: 4.71 10*3/MM3 (ref 3.4–10.8)

## 2021-03-01 PROCEDURE — 99213 OFFICE O/P EST LOW 20 MIN: CPT | Performed by: INTERNAL MEDICINE

## 2021-03-01 PROCEDURE — 85025 COMPLETE CBC W/AUTO DIFF WBC: CPT

## 2021-03-01 PROCEDURE — 84443 ASSAY THYROID STIM HORMONE: CPT | Performed by: INTERNAL MEDICINE

## 2021-03-01 PROCEDURE — 80053 COMPREHEN METABOLIC PANEL: CPT

## 2021-03-01 PROCEDURE — 84439 ASSAY OF FREE THYROXINE: CPT | Performed by: INTERNAL MEDICINE

## 2021-03-01 PROCEDURE — 36415 COLL VENOUS BLD VENIPUNCTURE: CPT

## 2021-03-01 RX ORDER — LEVOTHYROXINE SODIUM 0.1 MG/1
100 TABLET ORAL DAILY
COMMUNITY
End: 2021-03-29

## 2021-03-01 RX ORDER — LEVOTHYROXINE SODIUM 0.15 MG/1
125 TABLET ORAL DAILY
COMMUNITY
End: 2021-04-15 | Stop reason: DRUGHIGH

## 2021-03-01 NOTE — PROGRESS NOTES
Subjective   REASONS FOR FOLLOWUP:     1. Relapsed Hodgkin disease, now status post high dose therapy and autologous stem cell rescue at [place] on 01/28/2011.   2. Radiation therapy to the left neck and supraclavicular area at [facility] with Dr. Wilkerson completed 04/28/2011.   3. Ovarian failure following chemotherapy currently on hormone replacement and following up with Dr. Concetta Duncan, OB-GYN.   4. Hypothyroidism on Synthroid replacement.     HISTORY OF PRESENT ILLNESS:   Linda is a 40 y.o. female with the above-noted history of recurrent Hodgkin disease treated with high-dose therapy and stem cell transplant at [facility] in January 2011. Following her transplant, she also received radiation therapy to the left neck and supraclavicular area which she completed in April 2011.    She is here today for routine annual  followup and seems to be doing just fine.  She is now 10 years out from her stem cell transplant.    She had her mammogram performed 1/29/2020 with no suspicious findings in either breast.  This year's mammogram has been delayed due to Covid and weather issues but she is scheduled to have it done early next month    Her last TSH level and free T4 from 8/18/2020 were within normal limits.  Repeat labs from today are pending.     History of Present Illness      Past Medical History, Past Surgical History, Social History, Family History have been reviewed and are without significant changes except as mentioned.    Review of Systems   Constitutional: Negative for activity change, appetite change, fatigue, fever and unexpected weight change.   HENT: Negative for hearing loss, nosebleeds, trouble swallowing and voice change.    Eyes: Negative for visual disturbance.   Respiratory: Negative for cough, shortness of breath and wheezing.    Cardiovascular: Negative for chest pain and palpitations.   Gastrointestinal: Negative for abdominal pain, diarrhea,  "nausea and vomiting.   Genitourinary: Negative for difficulty urinating, frequency, hematuria and urgency.   Musculoskeletal: Negative for back pain and neck pain.   Skin: Negative for rash.   Neurological: Negative for dizziness, seizures, syncope and headaches.   Hematological: Negative for adenopathy. Does not bruise/bleed easily.   Psychiatric/Behavioral: Negative for behavioral problems. The patient is not nervous/anxious.       A comprehensive 14 point review of systems was performed and was negative except as mentioned.    Medications:  The current medication list was reviewed in the EMR    ALLERGIES:  No Known Allergies    Objective      Vitals:    03/01/21 0901   BP: 112/78   Pulse: 87   Resp: 16   Temp: 97.1 °F (36.2 °C)   TempSrc: Skin   SpO2: 98%   Weight: 93.9 kg (207 lb)   Height: 180.3 cm (70.98\")   PainSc: 0-No pain     Current Status 2/27/2020   ECOG score 0       Physical Exam   Constitutional: She is oriented to person, place, and time. She appears well-developed. No distress.   HENT:   Head: Normocephalic.   Eyes: Pupils are equal, round, and reactive to light. Conjunctivae are normal. No scleral icterus.   Neck: Normal range of motion. Neck supple. No JVD present. No thyromegaly present.   Cardiovascular: Normal rate and regular rhythm. Exam reveals no gallop and no friction rub.   No murmur heard.  Pulmonary/Chest: Effort normal and breath sounds normal. She has no wheezes. She has no rales.   Abdominal: Soft. She exhibits no distension and no mass. There is no abdominal tenderness.   Musculoskeletal: Normal range of motion. No deformity.   Lymphadenopathy:     She has no cervical adenopathy.   Neurological: She is alert and oriented to person, place, and time. She has normal reflexes. No cranial nerve deficit.   Skin: Skin is warm and dry. No rash noted. No erythema.   Psychiatric: Her behavior is normal. Judgment normal.         RECENT LABS:  Hematology WBC   Date Value Ref Range Status "   03/01/2021 4.71 3.40 - 10.80 10*3/mm3 Final     RBC   Date Value Ref Range Status   03/01/2021 4.28 3.77 - 5.28 10*6/mm3 Final     Hemoglobin   Date Value Ref Range Status   03/01/2021 13.2 12.0 - 15.9 g/dL Final     Hematocrit   Date Value Ref Range Status   03/01/2021 38.5 34.0 - 46.6 % Final     Platelets   Date Value Ref Range Status   03/01/2021 217 140 - 450 10*3/mm3 Final            Lab Results   Component Value Date    TSH 1.390 08/18/2020       Assessment/Plan   ASSESSMENT:   1. Hodgkin disease in remission following high dose therapy and autologous stem cell transplant at [place] in late January 2011. The patient continues to do well with no evidence of disease, now over 9 years out from her transplant.   2. Post-transplant consolidation radiation left neck and supraclavicular area completed 04/28/2011.   3. Ovarian failure following high-dose chemotherapy. The patient continues to followup with her OB/GYN, Dr. Concetta Duncan.                   4. Hypothyroidism, currently on Synthroid replacement.  Her most  recent labs have been normal.  PLAN:   1. Eloy will be scheduled to return for routine follow-up in 12 months  2. Since her initial chest irradiation was in 2010 she started breast imaging in 2019 when she was 38.   3. We will continue to monitor her thyroid studies and adjust her Synthroid as needed.  4. She will be having her screening mammogram in April and we asked that we receive a copy of the report when it is available..                  3/1/2021      CC:

## 2021-03-29 RX ORDER — LEVOTHYROXINE SODIUM 0.1 MG/1
TABLET ORAL
Qty: 90 TABLET | Refills: 2 | Status: SHIPPED | OUTPATIENT
Start: 2021-03-29 | End: 2022-03-21

## 2021-04-02 ENCOUNTER — BULK ORDERING (OUTPATIENT)
Dept: CASE MANAGEMENT | Facility: OTHER | Age: 41
End: 2021-04-02

## 2021-04-02 DIAGNOSIS — Z23 IMMUNIZATION DUE: ICD-10-CM

## 2021-04-15 ENCOUNTER — PROCEDURE VISIT (OUTPATIENT)
Dept: OBSTETRICS AND GYNECOLOGY | Age: 41
End: 2021-04-15

## 2021-04-15 ENCOUNTER — APPOINTMENT (OUTPATIENT)
Dept: WOMENS IMAGING | Facility: HOSPITAL | Age: 41
End: 2021-04-15

## 2021-04-15 ENCOUNTER — OFFICE VISIT (OUTPATIENT)
Dept: OBSTETRICS AND GYNECOLOGY | Age: 41
End: 2021-04-15

## 2021-04-15 VITALS
BODY MASS INDEX: 28.04 KG/M2 | SYSTOLIC BLOOD PRESSURE: 98 MMHG | WEIGHT: 207 LBS | DIASTOLIC BLOOD PRESSURE: 68 MMHG | HEIGHT: 72 IN

## 2021-04-15 DIAGNOSIS — Z12.31 VISIT FOR SCREENING MAMMOGRAM: Primary | ICD-10-CM

## 2021-04-15 DIAGNOSIS — Z01.419 WELL WOMAN EXAM WITH ROUTINE GYNECOLOGICAL EXAM: Primary | ICD-10-CM

## 2021-04-15 PROCEDURE — 77067 SCR MAMMO BI INCL CAD: CPT | Performed by: RADIOLOGY

## 2021-04-15 PROCEDURE — 77067 SCR MAMMO BI INCL CAD: CPT | Performed by: OBSTETRICS & GYNECOLOGY

## 2021-04-15 PROCEDURE — 99396 PREV VISIT EST AGE 40-64: CPT | Performed by: OBSTETRICS & GYNECOLOGY

## 2021-04-15 NOTE — PROGRESS NOTES
Routine Annual Visit    4/15/2021    Patient: Linda Marrufo          MR#:[id number]      Chief Complaint   Patient presents with   • Gynecologic Exam     AE and MG before exam today.  Pap 2019 = neg/neg. No cycles for ten years.  Currently has shingles.        History of Present Illness    41 y.o. female  who presents for annual exam.   Doing well, menopausal since BMT  Pap due next year  Small spot of shingles on shoulder,   mammo yearly , done today  Working from home part time and goes in half the week          No LMP recorded. (Menstrual status: Other).  Obstetric History:  OB History        0    Para   0    Term   0       0    AB   0    Living   0       SAB   0    TAB   0    Ectopic   0    Molar        Multiple   0    Live Births                   Menstrual History:     No LMP recorded. (Menstrual status: Other).       Sexual History:       ________________________________________  Patient Active Problem List   Diagnosis   • Hodgkin's disease (CMS/HCC)   • Ovarian failure due to cancer therapy   • Hypothyroidism, acquired       Past Medical History:   Diagnosis Date   • HA (headache)    • History of ovarian cyst    • History of weight gain    • Hodgkin disease (CMS/HCC)     RELAPSED   • Hypothyroidism    • Lymphoma (CMS/HCC)    • Menopause    • Ovarian failure due to cancer therapy        Past Surgical History:   Procedure Laterality Date   • HERNIA REPAIR      PT WAS 5 YO   • OTHER SURGICAL HISTORY Left     Insertion trifusion cath       Social History     Tobacco Use   Smoking Status Never Smoker   Smokeless Tobacco Never Used       has a current medication list which includes the following prescription(s): acyclovir and levothyroxine.  ________________________________________    Current contraception: post menopausal status  History of abnormal Pap smear: no  Family history of Breast cancer: no  Family history of uterine or ovarian cancer: no  Family History of colon  "cancer/colon polyps: no  History of abnormal mammogram: no      The following portions of the patient's history were reviewed and updated as appropriate: allergies, current medications, past family history, past medical history, past social history, past surgical history and problem list.    Review of Systems    Pertinent items are noted in HPI.     Objective   Physical Exam    BP 98/68   Ht 182.9 cm (72\")   Wt 93.9 kg (207 lb)   Breastfeeding No   BMI 28.07 kg/m²    BP Readings from Last 3 Encounters:   04/15/21 98/68   04/13/21 128/71   03/01/21 112/78      Wt Readings from Last 3 Encounters:   04/15/21 93.9 kg (207 lb)   04/13/21 86.2 kg (190 lb)   03/01/21 93.9 kg (207 lb)      BMI: Estimated body mass index is 28.07 kg/m² as calculated from the following:    Height as of this encounter: 182.9 cm (72\").    Weight as of this encounter: 93.9 kg (207 lb).      General:   alert, appears stated age and cooperative   Abdomen: soft, non-tender, without masses or organomegaly   Breast: inspection negative, no nipple discharge or bleeding, no masses or nodularity palpable   Vulva: normal   Vagina: normal mucosa   Cervix: no cervical motion tenderness and no lesions   Uterus: normal size, mobile or non-tender   Adnexa: no mass, fullness, tenderness     Assessment:    1. Normal annual exam   Assessment     ICD-10-CM ICD-9-CM   1. Well woman exam with routine gynecological exam  Z01.419 V72.31     Plan:    Plan     [x]  Mammogram request made  []  PAP done  []  Labs:   []  GC/Chl/TV  []  DEXA scan   []  Referral for colonoscopy:       Diagnoses and all orders for this visit:    1. Well woman exam with routine gynecological exam (Primary)            Counseling:  --Nutrition: Stressed importance of moderation and caloric balance, stressed fresh fruit and vegetables  --Exercise: Stressed the importance of regular exercise. 3-5 times weekly   - Discussed screening mammogram recommendations.   --Discussed benefits of " screening colonoscopy- age 45 unless FH  --Discussed pap smear screening recommendations

## 2021-04-19 ENCOUNTER — TELEPHONE (OUTPATIENT)
Dept: OBSTETRICS AND GYNECOLOGY | Age: 41
End: 2021-04-19

## 2021-04-19 NOTE — TELEPHONE ENCOUNTER
Pt called asking for mammogram results.  She said the results are not in MyChart but it says results are available.

## 2021-05-16 RX ORDER — PROMETHAZINE HYDROCHLORIDE 25 MG/1
25 TABLET ORAL EVERY 6 HOURS PRN
Qty: 15 TABLET | Refills: 0 | Status: SHIPPED | OUTPATIENT
Start: 2021-05-16 | End: 2022-12-20 | Stop reason: HOSPADM

## 2021-09-14 DIAGNOSIS — E03.9 HYPOTHYROIDISM, ACQUIRED: ICD-10-CM

## 2021-09-14 DIAGNOSIS — C81.21 MIXED CELLULARITY HODGKIN LYMPHOMA OF LYMPH NODES OF NECK (HCC): Primary | ICD-10-CM

## 2021-09-24 ENCOUNTER — OFFICE VISIT (OUTPATIENT)
Dept: ONCOLOGY | Facility: CLINIC | Age: 41
End: 2021-09-24

## 2021-09-24 ENCOUNTER — APPOINTMENT (OUTPATIENT)
Dept: LAB | Facility: HOSPITAL | Age: 41
End: 2021-09-24

## 2021-09-24 ENCOUNTER — LAB (OUTPATIENT)
Dept: LAB | Facility: HOSPITAL | Age: 41
End: 2021-09-24

## 2021-09-24 VITALS
HEIGHT: 72 IN | RESPIRATION RATE: 18 BRPM | OXYGEN SATURATION: 97 % | DIASTOLIC BLOOD PRESSURE: 67 MMHG | HEART RATE: 96 BPM | WEIGHT: 211.8 LBS | BODY MASS INDEX: 28.69 KG/M2 | SYSTOLIC BLOOD PRESSURE: 100 MMHG | TEMPERATURE: 97.7 F

## 2021-09-24 DIAGNOSIS — C81.21 MIXED CELLULARITY HODGKIN LYMPHOMA OF LYMPH NODES OF NECK (HCC): ICD-10-CM

## 2021-09-24 DIAGNOSIS — E03.9 HYPOTHYROIDISM, ACQUIRED: ICD-10-CM

## 2021-09-24 DIAGNOSIS — C81.21 MIXED CELLULARITY HODGKIN LYMPHOMA OF LYMPH NODES OF NECK (HCC): Primary | ICD-10-CM

## 2021-09-24 DIAGNOSIS — R42 DIZZY SPELLS: ICD-10-CM

## 2021-09-24 DIAGNOSIS — R42 LIGHTHEADED: ICD-10-CM

## 2021-09-24 LAB
ALBUMIN SERPL-MCNC: 4.6 G/DL (ref 3.5–5.2)
ALBUMIN/GLOB SERPL: 1.9 G/DL (ref 1.1–2.4)
ALP SERPL-CCNC: 50 U/L (ref 38–116)
ALT SERPL W P-5'-P-CCNC: 16 U/L (ref 0–33)
ANION GAP SERPL CALCULATED.3IONS-SCNC: 8.3 MMOL/L (ref 5–15)
AST SERPL-CCNC: 22 U/L (ref 0–32)
BASOPHILS # BLD AUTO: 0.03 10*3/MM3 (ref 0–0.2)
BASOPHILS NFR BLD AUTO: 0.6 % (ref 0–1.5)
BILIRUB SERPL-MCNC: 0.4 MG/DL (ref 0.2–1.2)
BUN SERPL-MCNC: 9 MG/DL (ref 6–20)
BUN/CREAT SERPL: 9.6 (ref 7.3–30)
CALCIUM SPEC-SCNC: 9.6 MG/DL (ref 8.5–10.2)
CHLORIDE SERPL-SCNC: 105 MMOL/L (ref 98–107)
CO2 SERPL-SCNC: 27.7 MMOL/L (ref 22–29)
CREAT SERPL-MCNC: 0.94 MG/DL (ref 0.6–1.1)
DEPRECATED RDW RBC AUTO: 38.6 FL (ref 37–54)
EOSINOPHIL # BLD AUTO: 0.05 10*3/MM3 (ref 0–0.4)
EOSINOPHIL NFR BLD AUTO: 1.1 % (ref 0.3–6.2)
ERYTHROCYTE [DISTWIDTH] IN BLOOD BY AUTOMATED COUNT: 11.9 % (ref 12.3–15.4)
FERRITIN SERPL-MCNC: 173.2 NG/ML (ref 11–207)
FOLATE SERPL-MCNC: 14 NG/ML (ref 4.78–24.2)
GFR SERPL CREATININE-BSD FRML MDRD: 66 ML/MIN/1.73
GLOBULIN UR ELPH-MCNC: 2.4 GM/DL (ref 1.8–3.5)
GLUCOSE SERPL-MCNC: 96 MG/DL (ref 74–124)
HCG INTACT+B SERPL-ACNC: 1.38 MIU/ML
HCT VFR BLD AUTO: 40.5 % (ref 34–46.6)
HGB BLD-MCNC: 13.7 G/DL (ref 12–15.9)
IMM GRANULOCYTES # BLD AUTO: 0 10*3/MM3 (ref 0–0.05)
IMM GRANULOCYTES NFR BLD AUTO: 0 % (ref 0–0.5)
IRON 24H UR-MRATE: 81 MCG/DL (ref 37–145)
IRON SATN MFR SERPL: 21 % (ref 14–48)
LYMPHOCYTES # BLD AUTO: 1.93 10*3/MM3 (ref 0.7–3.1)
LYMPHOCYTES NFR BLD AUTO: 41.4 % (ref 19.6–45.3)
MCH RBC QN AUTO: 30.2 PG (ref 26.6–33)
MCHC RBC AUTO-ENTMCNC: 33.8 G/DL (ref 31.5–35.7)
MCV RBC AUTO: 89.2 FL (ref 79–97)
MONOCYTES # BLD AUTO: 0.39 10*3/MM3 (ref 0.1–0.9)
MONOCYTES NFR BLD AUTO: 8.4 % (ref 5–12)
NEUTROPHILS NFR BLD AUTO: 2.26 10*3/MM3 (ref 1.7–7)
NEUTROPHILS NFR BLD AUTO: 48.5 % (ref 42.7–76)
NRBC BLD AUTO-RTO: 0 /100 WBC (ref 0–0.2)
PLATELET # BLD AUTO: 220 10*3/MM3 (ref 140–450)
PMV BLD AUTO: 8 FL (ref 6–12)
POTASSIUM SERPL-SCNC: 3.8 MMOL/L (ref 3.5–4.7)
PROT SERPL-MCNC: 7 G/DL (ref 6.3–8)
RBC # BLD AUTO: 4.54 10*6/MM3 (ref 3.77–5.28)
SODIUM SERPL-SCNC: 141 MMOL/L (ref 134–145)
TIBC SERPL-MCNC: 381 MCG/DL (ref 249–505)
TRANSFERRIN SERPL-MCNC: 272 MG/DL (ref 200–360)
VIT B12 BLD-MCNC: 338 PG/ML (ref 211–946)
WBC # BLD AUTO: 4.66 10*3/MM3 (ref 3.4–10.8)

## 2021-09-24 PROCEDURE — 84466 ASSAY OF TRANSFERRIN: CPT

## 2021-09-24 PROCEDURE — 82607 VITAMIN B-12: CPT | Performed by: INTERNAL MEDICINE

## 2021-09-24 PROCEDURE — 82746 ASSAY OF FOLIC ACID SERUM: CPT | Performed by: INTERNAL MEDICINE

## 2021-09-24 PROCEDURE — 85025 COMPLETE CBC W/AUTO DIFF WBC: CPT

## 2021-09-24 PROCEDURE — 99215 OFFICE O/P EST HI 40 MIN: CPT | Performed by: NURSE PRACTITIONER

## 2021-09-24 PROCEDURE — 80053 COMPREHEN METABOLIC PANEL: CPT

## 2021-09-24 PROCEDURE — 83540 ASSAY OF IRON: CPT

## 2021-09-24 PROCEDURE — 82728 ASSAY OF FERRITIN: CPT

## 2021-09-24 PROCEDURE — 36415 COLL VENOUS BLD VENIPUNCTURE: CPT

## 2021-09-24 PROCEDURE — 84702 CHORIONIC GONADOTROPIN TEST: CPT | Performed by: INTERNAL MEDICINE

## 2021-10-08 ENCOUNTER — OFFICE VISIT (OUTPATIENT)
Dept: CARDIOLOGY | Facility: CLINIC | Age: 41
End: 2021-10-08

## 2021-10-08 VITALS
WEIGHT: 215 LBS | HEART RATE: 75 BPM | DIASTOLIC BLOOD PRESSURE: 70 MMHG | BODY MASS INDEX: 29.12 KG/M2 | SYSTOLIC BLOOD PRESSURE: 102 MMHG | HEIGHT: 72 IN

## 2021-10-08 DIAGNOSIS — R42 DIZZINESS: Primary | ICD-10-CM

## 2021-10-08 DIAGNOSIS — E78.5 DYSLIPIDEMIA: ICD-10-CM

## 2021-10-08 DIAGNOSIS — C81.21 MIXED CELLULARITY HODGKIN LYMPHOMA OF LYMPH NODES OF NECK (HCC): ICD-10-CM

## 2021-10-08 PROCEDURE — 93000 ELECTROCARDIOGRAM COMPLETE: CPT | Performed by: INTERNAL MEDICINE

## 2021-10-08 PROCEDURE — 99204 OFFICE O/P NEW MOD 45 MIN: CPT | Performed by: INTERNAL MEDICINE

## 2021-10-08 NOTE — PROGRESS NOTES
Date of Office Visit: 10/08/2021  Encounter Provider: Delia Low MD  Place of Service: [facility]  Patient Name: Linda Marrufo  :1980    Chief complaint  Consult requested by Dr. Lees for evaluation of dizziness, fatigue.    History of Present Illness  Patient is a 41-year-old female with history of non-Hodgkin's lymphoma, hypothyroidism and no prior cardiac history.  She has a history of non-Hodgkin's lymphoma treated with high-dose therapy (she clearly remembers having Adriamycin.  Did have ABVD therapy and stem cell transplantation at [facility] in 2011.  She subsequently deceived radiation therapy to the left neck and supraclavicular region in 2011.  She subsequently developed ovarian failure.  She also has hypothyroidism.    She is usually active working out doing Pilates.  However early September she did work out twice in a day later that day started having dizziness lightheadedness and near syncope.  This occurred in a recurrent pattern over the subsequent 2-1/2 weeks so she has decreased her exercise regimen factors not exercising.  The occur when she is standing and exercising on 2 occasions when she is doing high intensity exercise.  She occasionally has lightheadedness with standing suddenly one occurred when she was sitting at her sister's wedding.  She recognizes feeling hot and flushed prior to these episodes denies any palpitations or chest pain.  She does also note that when she was hot she would get dizzy and passed out as a young child especially in [employer] she states blood work on 2021 includes normal CMP normal iron levels, normal hemoglobin.    Past Medical History:   Diagnosis Date   • HA (headache)    • History of ovarian cyst    • History of weight gain    • Hodgkin disease (HCC)     RELAPSED   • Hypothyroidism    • Lymphoma (HCC)    • Menopause    • Ovarian failure due to cancer therapy      Past Surgical History:    Procedure Laterality Date   • HERNIA REPAIR      PT WAS 5 YO   • OTHER SURGICAL HISTORY Left 2010    Insertion trifusion cath     Outpatient Medications Prior to Visit   Medication Sig Dispense Refill   • levothyroxine (SYNTHROID, LEVOTHROID) 100 MCG tablet TAKE 1 TABLET BY MOUTH EVERY MORNING BEFORE BREAKFAST 90 tablet 2   • promethazine (PHENERGAN) 25 MG tablet Take 1 tablet by mouth Every 6 (Six) Hours As Needed for Nausea or Vomiting. 15 tablet 0     No facility-administered medications prior to visit.       Allergies as of 10/08/2021   • (No Known Allergies)     Social History     Socioeconomic History   • Marital status: Single   • Number of children: 0   • Years of education: College   Tobacco Use   • Smoking status: Never Smoker   • Smokeless tobacco: Never Used   Vaping Use   • Vaping Use: Never used   Substance and Sexual Activity   • Alcohol use: Yes     Comment: OCCASIONAL   • Drug use: No     Family History   Problem Relation Age of Onset   • No Known Problems Mother    • Diabetes Father    • Stroke Father    • No Known Problems Sister    • No Known Problems Brother    • No Known Problems Maternal Aunt    • No Known Problems Maternal Uncle    • Other Paternal Aunt    • No Known Problems Paternal Uncle    • No Known Problems Maternal Grandmother    • No Known Problems Maternal Grandfather    • No Known Problems Paternal Grandmother    • No Known Problems Paternal Grandfather    • Cancer Neg Hx      Review of Systems   Constitutional: Negative for chills, fever, weight gain and weight loss.   Cardiovascular: Negative for leg swelling.   Respiratory: Negative for cough, snoring and wheezing.    Hematologic/Lymphatic: Negative for bleeding problem. Does not bruise/bleed easily.   Skin: Negative for color change.   Musculoskeletal: Negative for falls, joint pain and myalgias.   Gastrointestinal: Negative for melena.   Genitourinary: Negative for hematuria.   Neurological: Positive for dizziness. Negative  "for excessive daytime sleepiness.   Psychiatric/Behavioral: Negative for depression. The patient is not nervous/anxious.         Objective:     Vitals:    10/08/21 0820 10/08/21 0824   BP: 102/70 102/70   BP Location: Left arm Right arm   Pulse: 75    Weight: 97.5 kg (215 lb)    Height: 182.9 cm (72\")      Body mass index is 29.16 kg/m².    Vitals reviewed.   Constitutional:       Appearance: Well-developed.   Eyes:      General: No scleral icterus.        Right eye: No discharge.      Conjunctiva/sclera: Conjunctivae normal.      Pupils: Pupils are equal, round, and reactive to light.   HENT:      Head: Normocephalic.      Nose: Nose normal.   Neck:      Thyroid: No thyromegaly.      Vascular: No JVD.   Pulmonary:      Effort: Pulmonary effort is normal. No respiratory distress.      Breath sounds: Normal breath sounds. No wheezing. No rales.   Cardiovascular:      Normal rate. Regular rhythm.      No gallop.   Edema:     Peripheral edema absent.   Abdominal:      General: Bowel sounds are normal. There is no distension.      Palpations: Abdomen is soft.      Tenderness: There is no abdominal tenderness. There is no rebound.   Musculoskeletal: Normal range of motion.         General: No tenderness.      Cervical back: Normal range of motion and neck supple. Skin:     General: Skin is warm and dry.      Findings: No erythema or rash.   Neurological:      Mental Status: Alert and oriented to person, place, and time.   Psychiatric:         Behavior: Behavior normal.         Thought Content: Thought content normal.         Judgment: Judgment normal.       Lab Review:     ECG 12 Lead    Date/Time: 10/8/2021 8:40 AM  Performed by: Delia Low MD  Authorized by: Delia Low MD   Comparison: compared with previous ECG   Similar to previous ECG  Rhythm: sinus rhythm  Conduction: 1st degree AV block    Clinical impression: abnormal EKG          Assessment:       Diagnosis Plan   1. Dizziness  ECG 12 Lead    Adult " Transthoracic Echo Complete W/ Cont if Necessary Per Protocol    Holter Monitor - 72 Hour Up To 15 Days    Treadmill Stress Test    Duplex Carotid Ultrasound CAR   2. Dyslipidemia  Lipid Panel   3. 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  Dizziness.  Certainly has vasovagal symptoms.  With recent exacerbation with aggressive exercise recently [place] the camp.  It has however persisted.  This may still be a component of vasovagal syndrome but she also had bilateral neck radiation and right upper chest radiation.  With this in mind we will check carotid Doppler ultrasound.  History of anthracycline therapy will also check an echocardiogram.  As these events occur with exertion and she wishes to turn back to aggressive exercise, will check a treadmill stress test.  Will place a 2-week Zio patch.  2.  History of bilateral neck and chest radiation  3.  History of non-Hodgkin's lymphoma status post high-dose intensity therapy followed by transplantation and then radiation therapy  4.  Hypothyroidism  5.  Dyslipidemia.  In 2016 LDL remain elevated HDL however was quite low and triglycerides were high.  I placed an order for lipid panel    Time Spent: I spent 50 minutes caring for Linda on this date of service. This time includes time spent by me in the following activities: preparing for the visit, reviewing tests, obtaining and/or reviewing a separately obtained history, performing a medically appropriate examination and/or evaluation, counseling and educating the patient/family/caregiver, ordering medications, tests, or procedures, documenting information in the medical record and independently interpreting results and communicating that information with the patient/family/caregiver.   I spent 1 minutes on the separately reported service of ECG. This time is not included in the time used to support the E/M service also reported today.        Your medication list           Accurate as of October 8, 2021 11:59 PM. If you have any questions, ask your nurse or doctor.            CONTINUE taking these medications      Instructions Last Dose Given Next Dose Due   levothyroxine 100 MCG tablet  Commonly known as: SYNTHROID, LEVOTHROID      TAKE 1 TABLET BY MOUTH EVERY MORNING BEFORE BREAKFAST       promethazine 25 MG tablet  Commonly known as: PHENERGAN      Take 1 tablet by mouth Every 6 (Six) Hours As Needed for Nausea or Vomiting.            Dictated utilizing Dragon dictation

## 2021-10-28 ENCOUNTER — HOSPITAL ENCOUNTER (OUTPATIENT)
Dept: CARDIOLOGY | Facility: HOSPITAL | Age: 41
Discharge: HOME | End: 2021-10-28

## 2021-10-28 ENCOUNTER — LAB (OUTPATIENT)
Dept: LAB | Facility: HOSPITAL | Age: 41
End: 2021-10-28

## 2021-10-28 VITALS
BODY MASS INDEX: 29.12 KG/M2 | HEIGHT: 72 IN | DIASTOLIC BLOOD PRESSURE: 70 MMHG | HEART RATE: 75 BPM | SYSTOLIC BLOOD PRESSURE: 102 MMHG | WEIGHT: 215 LBS

## 2021-10-28 DIAGNOSIS — R42 DIZZINESS: ICD-10-CM

## 2021-10-28 DIAGNOSIS — E78.5 DYSLIPIDEMIA: ICD-10-CM

## 2021-10-28 LAB
BH CV STRESS BP STAGE 1: NORMAL
BH CV STRESS BP STAGE 2: NORMAL
BH CV STRESS BP STAGE 3: NORMAL
BH CV STRESS DURATION MIN STAGE 1: 3
BH CV STRESS DURATION MIN STAGE 2: 3
BH CV STRESS DURATION MIN STAGE 3: 3
BH CV STRESS DURATION MIN STAGE 4: 1
BH CV STRESS DURATION SEC STAGE 1: 0
BH CV STRESS DURATION SEC STAGE 2: 0
BH CV STRESS DURATION SEC STAGE 3: 0
BH CV STRESS DURATION SEC STAGE 4: 0
BH CV STRESS GRADE STAGE 1: 10
BH CV STRESS GRADE STAGE 2: 12
BH CV STRESS GRADE STAGE 3: 14
BH CV STRESS GRADE STAGE 4: 16
BH CV STRESS HR STAGE 1: 130
BH CV STRESS HR STAGE 2: 152
BH CV STRESS HR STAGE 3: 169
BH CV STRESS HR STAGE 4: 176
BH CV STRESS METS STAGE 1: 5
BH CV STRESS METS STAGE 2: 7.5
BH CV STRESS METS STAGE 3: 10
BH CV STRESS METS STAGE 4: 13.5
BH CV STRESS PROTOCOL 1: NORMAL
BH CV STRESS RECOVERY BP: NORMAL MMHG
BH CV STRESS RECOVERY HR: 103 BPM
BH CV STRESS SPEED STAGE 1: 1.7
BH CV STRESS SPEED STAGE 2: 2.5
BH CV STRESS SPEED STAGE 3: 3.4
BH CV STRESS SPEED STAGE 4: 4.2
BH CV STRESS STAGE 1: 1
BH CV STRESS STAGE 2: 2
BH CV STRESS STAGE 3: 3
BH CV STRESS STAGE 4: 4
BH CV XLRA MEAS LEFT DIST CCA EDV: 31.1 CM/SEC
BH CV XLRA MEAS LEFT DIST CCA PSV: 78.9 CM/SEC
BH CV XLRA MEAS LEFT DIST ICA EDV: -68.3 CM/SEC
BH CV XLRA MEAS LEFT DIST ICA PSV: -129.8 CM/SEC
BH CV XLRA MEAS LEFT ICA/CCA RATIO: 1.6
BH CV XLRA MEAS LEFT MID CCA EDV: 29.8 CM/SEC
BH CV XLRA MEAS LEFT MID CCA PSV: 69.6 CM/SEC
BH CV XLRA MEAS LEFT MID ICA EDV: -55.3 CM/SEC
BH CV XLRA MEAS LEFT MID ICA PSV: -112.4 CM/SEC
BH CV XLRA MEAS LEFT PROX CCA EDV: -32.3 CM/SEC
BH CV XLRA MEAS LEFT PROX CCA PSV: -81.4 CM/SEC
BH CV XLRA MEAS LEFT PROX ECA PSV: 100.6 CM/SEC
BH CV XLRA MEAS LEFT PROX ICA EDV: -29.2 CM/SEC
BH CV XLRA MEAS LEFT PROX ICA PSV: -76.4 CM/SEC
BH CV XLRA MEAS LEFT PROX SCLA PSV: 95.5 CM/SEC
BH CV XLRA MEAS LEFT VERTEBRAL A PSV: -62.7 CM/SEC
BH CV XLRA MEAS RIGHT DIST CCA EDV: -25.5 CM/SEC
BH CV XLRA MEAS RIGHT DIST CCA PSV: -70.8 CM/SEC
BH CV XLRA MEAS RIGHT DIST ICA EDV: -37.9 CM/SEC
BH CV XLRA MEAS RIGHT DIST ICA PSV: -86.4 CM/SEC
BH CV XLRA MEAS RIGHT ICA/CCA RATIO: 1.1
BH CV XLRA MEAS RIGHT MID CCA EDV: -29.2 CM/SEC
BH CV XLRA MEAS RIGHT MID CCA PSV: -78.9 CM/SEC
BH CV XLRA MEAS RIGHT MID ICA EDV: -28 CM/SEC
BH CV XLRA MEAS RIGHT MID ICA PSV: -71.4 CM/SEC
BH CV XLRA MEAS RIGHT PROX CCA EDV: -28 CM/SEC
BH CV XLRA MEAS RIGHT PROX CCA PSV: -88.2 CM/SEC
BH CV XLRA MEAS RIGHT PROX ECA PSV: -89.5 CM/SEC
BH CV XLRA MEAS RIGHT PROX ICA EDV: -29.8 CM/SEC
BH CV XLRA MEAS RIGHT PROX ICA PSV: -75.8 CM/SEC
BH CV XLRA MEAS RIGHT PROX SCLA PSV: 95.7 CM/SEC
BH CV XLRA MEAS RIGHT VERTEBRAL A PSV: -41.6 CM/SEC
CHOLEST SERPL-MCNC: 188 MG/DL (ref 0–200)
HDLC SERPL-MCNC: 48 MG/DL (ref 40–60)
LDLC SERPL CALC-MCNC: 115 MG/DL (ref 0–100)
LDLC/HDLC SERPL: 2.33 {RATIO}
MAXIMAL PREDICTED HEART RATE: 179 BPM
MAXIMAL PREDICTED HEART RATE: 179 BPM
PERCENT MAX PREDICTED HR: 98.32 %
STRESS BASELINE BP: NORMAL MMHG
STRESS BASELINE HR: 96 BPM
STRESS PERCENT HR: 116 %
STRESS POST ESTIMATED WORKLOAD: 11 METS
STRESS POST EXERCISE DUR MIN: 10 MIN
STRESS POST EXERCISE DUR SEC: 0 SEC
STRESS POST PEAK BP: NORMAL MMHG
STRESS POST PEAK HR: 176 BPM
STRESS TARGET HR: 152 BPM
STRESS TARGET HR: 152 BPM
TRIGL SERPL-MCNC: 141 MG/DL (ref 0–150)
VLDLC SERPL-MCNC: 25 MG/DL (ref 5–40)

## 2021-10-28 PROCEDURE — 93880 EXTRACRANIAL BILAT STUDY: CPT | Performed by: INTERNAL MEDICINE

## 2021-10-28 PROCEDURE — 93306 TTE W/DOPPLER COMPLETE: CPT

## 2021-10-28 PROCEDURE — 93018 CV STRESS TEST I&R ONLY: CPT | Performed by: INTERNAL MEDICINE

## 2021-10-28 PROCEDURE — 25010000002 PERFLUTREN (DEFINITY) 8.476 MG IN SODIUM CHLORIDE (PF) 0.9 % 10 ML INJECTION: Performed by: INTERNAL MEDICINE

## 2021-10-28 PROCEDURE — 80061 LIPID PANEL: CPT

## 2021-10-28 PROCEDURE — 93356 MYOCRD STRAIN IMG SPCKL TRCK: CPT | Performed by: INTERNAL MEDICINE

## 2021-10-28 PROCEDURE — 93356 MYOCRD STRAIN IMG SPCKL TRCK: CPT

## 2021-10-28 PROCEDURE — 93306 TTE W/DOPPLER COMPLETE: CPT | Performed by: INTERNAL MEDICINE

## 2021-10-28 PROCEDURE — 93880 EXTRACRANIAL BILAT STUDY: CPT

## 2021-10-28 PROCEDURE — 93016 CV STRESS TEST SUPVJ ONLY: CPT | Performed by: INTERNAL MEDICINE

## 2021-10-28 PROCEDURE — 93017 CV STRESS TEST TRACING ONLY: CPT

## 2021-10-28 PROCEDURE — 36415 COLL VENOUS BLD VENIPUNCTURE: CPT

## 2021-10-28 RX ADMIN — PERFLUTREN 2 ML: 6.52 INJECTION, SUSPENSION INTRAVENOUS at 11:04

## 2021-10-29 ENCOUNTER — TELEPHONE (OUTPATIENT)
Dept: CARDIOLOGY | Facility: CLINIC | Age: 41
End: 2021-10-29

## 2021-10-29 LAB
AORTIC ARCH: 1.8 CM
ASCENDING AORTA: 2.9 CM
BH CV ECHO MEAS - ACS: 1.9 CM
BH CV ECHO MEAS - AO MAX PG (FULL): 1.8 MMHG
BH CV ECHO MEAS - AO MAX PG: 6.2 MMHG
BH CV ECHO MEAS - AO MEAN PG (FULL): 1.2 MMHG
BH CV ECHO MEAS - AO MEAN PG: 3.6 MMHG
BH CV ECHO MEAS - AO ROOT AREA (BSA CORRECTED): 1.2
BH CV ECHO MEAS - AO ROOT AREA: 5.7 CM^2
BH CV ECHO MEAS - AO ROOT DIAM: 2.7 CM
BH CV ECHO MEAS - AO V2 MAX: 124.2 CM/SEC
BH CV ECHO MEAS - AO V2 MEAN: 89.2 CM/SEC
BH CV ECHO MEAS - AO V2 VTI: 21.9 CM
BH CV ECHO MEAS - ASC AORTA: 2.9 CM
BH CV ECHO MEAS - AVA(I,A): 2.6 CM^2
BH CV ECHO MEAS - AVA(I,D): 2.6 CM^2
BH CV ECHO MEAS - AVA(V,A): 2.6 CM^2
BH CV ECHO MEAS - AVA(V,D): 2.6 CM^2
BH CV ECHO MEAS - BSA(HAYCOCK): 2.2 M^2
BH CV ECHO MEAS - BSA: 2.2 M^2
BH CV ECHO MEAS - BZI_BMI: 29.2 KILOGRAMS/M^2
BH CV ECHO MEAS - BZI_METRIC_HEIGHT: 182.9 CM
BH CV ECHO MEAS - BZI_METRIC_WEIGHT: 97.5 KG
BH CV ECHO MEAS - EDV(CUBED): 94.5 ML
BH CV ECHO MEAS - EDV(MOD-SP2): 90 ML
BH CV ECHO MEAS - EDV(MOD-SP4): 130 ML
BH CV ECHO MEAS - EDV(TEICH): 95.1 ML
BH CV ECHO MEAS - EF(CUBED): 61.7 %
BH CV ECHO MEAS - EF(MOD-BP): 71.1 %
BH CV ECHO MEAS - EF(MOD-SP2): 73.3 %
BH CV ECHO MEAS - EF(MOD-SP4): 67.7 %
BH CV ECHO MEAS - EF(TEICH): 53.3 %
BH CV ECHO MEAS - EF_3D-VOL: 68 %
BH CV ECHO MEAS - ESV(CUBED): 36.2 ML
BH CV ECHO MEAS - ESV(MOD-SP2): 24 ML
BH CV ECHO MEAS - ESV(MOD-SP4): 42 ML
BH CV ECHO MEAS - ESV(TEICH): 44.4 ML
BH CV ECHO MEAS - FS: 27.3 %
BH CV ECHO MEAS - IVS/LVPW: 0.84
BH CV ECHO MEAS - IVSD: 0.9 CM
BH CV ECHO MEAS - LAT PEAK E' VEL: 10.2 CM/SEC
BH CV ECHO MEAS - LV DIASTOLIC VOL/BSA (35-75): 59.2 ML/M^2
BH CV ECHO MEAS - LV MASS(C)D: 153.9 GRAMS
BH CV ECHO MEAS - LV MASS(C)DI: 70 GRAMS/M^2
BH CV ECHO MEAS - LV MAX PG: 4.3 MMHG
BH CV ECHO MEAS - LV MEAN PG: 2.4 MMHG
BH CV ECHO MEAS - LV SYSTOLIC VOL/BSA (12-30): 19.1 ML/M^2
BH CV ECHO MEAS - LV V1 MAX: 104.1 CM/SEC
BH CV ECHO MEAS - LV V1 MEAN: 72.7 CM/SEC
BH CV ECHO MEAS - LV V1 VTI: 17.9 CM
BH CV ECHO MEAS - LVIDD: 4.6 CM
BH CV ECHO MEAS - LVIDS: 3.3 CM
BH CV ECHO MEAS - LVLD AP2: 8.6 CM
BH CV ECHO MEAS - LVLD AP4: 8.3 CM
BH CV ECHO MEAS - LVLS AP2: 6.6 CM
BH CV ECHO MEAS - LVLS AP4: 6.6 CM
BH CV ECHO MEAS - LVOT AREA (M): 3.1 CM^2
BH CV ECHO MEAS - LVOT AREA: 3.2 CM^2
BH CV ECHO MEAS - LVOT DIAM: 2 CM
BH CV ECHO MEAS - LVPWD: 1.1 CM
BH CV ECHO MEAS - MED PEAK E' VEL: 7.9 CM/SEC
BH CV ECHO MEAS - MR MAX PG: 105.1 MMHG
BH CV ECHO MEAS - MR MAX VEL: 512.5 CM/SEC
BH CV ECHO MEAS - MV A DUR: 0.15 SEC
BH CV ECHO MEAS - MV A MAX VEL: 74.7 CM/SEC
BH CV ECHO MEAS - MV DEC SLOPE: 466.8 CM/SEC^2
BH CV ECHO MEAS - MV DEC TIME: 0.12 SEC
BH CV ECHO MEAS - MV E MAX VEL: 55.7 CM/SEC
BH CV ECHO MEAS - MV E/A: 0.75
BH CV ECHO MEAS - MV MAX PG: 4.9 MMHG
BH CV ECHO MEAS - MV MEAN PG: 2.4 MMHG
BH CV ECHO MEAS - MV P1/2T MAX VEL: 93.4 CM/SEC
BH CV ECHO MEAS - MV P1/2T: 58.6 MSEC
BH CV ECHO MEAS - MV V2 MAX: 110.8 CM/SEC
BH CV ECHO MEAS - MV V2 MEAN: 72.7 CM/SEC
BH CV ECHO MEAS - MV V2 VTI: 24.9 CM
BH CV ECHO MEAS - MVA P1/2T LCG: 2.4 CM^2
BH CV ECHO MEAS - MVA(P1/2T): 3.8 CM^2
BH CV ECHO MEAS - MVA(VTI): 2.3 CM^2
BH CV ECHO MEAS - PA ACC TIME: 0.13 SEC
BH CV ECHO MEAS - PA MAX PG (FULL): 1.6 MMHG
BH CV ECHO MEAS - PA MAX PG: 3.4 MMHG
BH CV ECHO MEAS - PA PR(ACCEL): 22 MMHG
BH CV ECHO MEAS - PA V2 MAX: 92.3 CM/SEC
BH CV ECHO MEAS - PULM A REVS DUR: 0.12 SEC
BH CV ECHO MEAS - PULM A REVS VEL: 30.9 CM/SEC
BH CV ECHO MEAS - PULM DIAS VEL: 36.2 CM/SEC
BH CV ECHO MEAS - PULM S/D: 1.1
BH CV ECHO MEAS - PULM SYS VEL: 38.1 CM/SEC
BH CV ECHO MEAS - PVA(V,A): 2.1 CM^2
BH CV ECHO MEAS - PVA(V,D): 2.1 CM^2
BH CV ECHO MEAS - QP/QS: 0.85
BH CV ECHO MEAS - RAP SYSTOLE: 3 MMHG
BH CV ECHO MEAS - RV MAX PG: 1.8 MMHG
BH CV ECHO MEAS - RV MEAN PG: 1 MMHG
BH CV ECHO MEAS - RV V1 MAX: 66.8 CM/SEC
BH CV ECHO MEAS - RV V1 MEAN: 48.2 CM/SEC
BH CV ECHO MEAS - RV V1 VTI: 16.2 CM
BH CV ECHO MEAS - RVOT AREA: 3 CM^2
BH CV ECHO MEAS - RVOT DIAM: 1.9 CM
BH CV ECHO MEAS - SI(AO): 56.5 ML/M^2
BH CV ECHO MEAS - SI(CUBED): 26.5 ML/M^2
BH CV ECHO MEAS - SI(LVOT): 25.6 ML/M^2
BH CV ECHO MEAS - SI(MOD-SP2): 30 ML/M^2
BH CV ECHO MEAS - SI(MOD-SP4): 40.1 ML/M^2
BH CV ECHO MEAS - SI(TEICH): 23.1 ML/M^2
BH CV ECHO MEAS - SUP REN AO DIAM: 1.5 CM
BH CV ECHO MEAS - SV(AO): 124.1 ML
BH CV ECHO MEAS - SV(CUBED): 58.3 ML
BH CV ECHO MEAS - SV(LVOT): 56.3 ML
BH CV ECHO MEAS - SV(MOD-SP2): 66 ML
BH CV ECHO MEAS - SV(MOD-SP4): 88 ML
BH CV ECHO MEAS - SV(RVOT): 47.9 ML
BH CV ECHO MEAS - SV(TEICH): 50.7 ML
BH CV ECHO MEAS - TAPSE (>1.6): 1.7 CM
BH CV ECHO MEASUREMENTS AVERAGE E/E' RATIO: 6.15
BH CV XLRA - RV BASE: 3.1 CM
BH CV XLRA - RV LENGTH: 5.6 CM
BH CV XLRA - RV MID: 2.4 CM
BH CV XLRA - TDI S': 11.9 CM/SEC
LEFT ATRIUM VOLUME INDEX: 22 ML/M2
LV EF 2D ECHO EST: 71 %
MAXIMAL PREDICTED HEART RATE: 179 BPM
SINUS: 2.7 CM
STJ: 2.5 CM
STRESS TARGET HR: 152 BPM

## 2021-11-04 ENCOUNTER — TELEPHONE (OUTPATIENT)
Dept: CARDIOLOGY | Facility: CLINIC | Age: 41
End: 2021-11-04

## 2021-11-04 NOTE — TELEPHONE ENCOUNTER
Please let the patient know the monitor shows sinus rhythm without significant major heart block.  It does have a slight slowing of the heartbeat but nothing that would lead spells or dizziness.  These are likely vagally mediated please have the patient stay hydrated and drink at least 1 bottle of water prior to exercise or walking in heights.  Keep appointment with Patsy Fonseca in 2/2021.  Call earlier if dizziness recurs

## 2021-11-05 NOTE — TELEPHONE ENCOUNTER
Patient notified of results and recommendations and verbalized understanding  Mackenzie Gonzalez RN  Triage nurse

## 2022-02-01 ENCOUNTER — OFFICE VISIT (OUTPATIENT)
Dept: CARDIOLOGY | Facility: CLINIC | Age: 42
End: 2022-02-01

## 2022-02-01 VITALS
SYSTOLIC BLOOD PRESSURE: 110 MMHG | HEART RATE: 79 BPM | BODY MASS INDEX: 28.17 KG/M2 | WEIGHT: 208 LBS | DIASTOLIC BLOOD PRESSURE: 68 MMHG | HEIGHT: 72 IN

## 2022-02-01 DIAGNOSIS — R55 VASOVAGAL SYMPTOM: ICD-10-CM

## 2022-02-01 DIAGNOSIS — R42 LIGHTHEADEDNESS: Primary | ICD-10-CM

## 2022-02-01 PROCEDURE — 93000 ELECTROCARDIOGRAM COMPLETE: CPT | Performed by: NURSE PRACTITIONER

## 2022-02-01 PROCEDURE — 99214 OFFICE O/P EST MOD 30 MIN: CPT | Performed by: NURSE PRACTITIONER

## 2022-03-09 ENCOUNTER — OFFICE VISIT (OUTPATIENT)
Dept: ONCOLOGY | Facility: CLINIC | Age: 42
End: 2022-03-09

## 2022-03-09 ENCOUNTER — LAB (OUTPATIENT)
Dept: LAB | Facility: HOSPITAL | Age: 42
End: 2022-03-09

## 2022-03-09 VITALS
BODY MASS INDEX: 27.56 KG/M2 | SYSTOLIC BLOOD PRESSURE: 111 MMHG | HEIGHT: 72 IN | OXYGEN SATURATION: 98 % | DIASTOLIC BLOOD PRESSURE: 76 MMHG | HEART RATE: 72 BPM | TEMPERATURE: 97.7 F | WEIGHT: 203.5 LBS | RESPIRATION RATE: 18 BRPM

## 2022-03-09 DIAGNOSIS — E89.40 OVARIAN FAILURE DUE TO CANCER THERAPY: ICD-10-CM

## 2022-03-09 DIAGNOSIS — E03.9 HYPOTHYROIDISM, ACQUIRED: ICD-10-CM

## 2022-03-09 DIAGNOSIS — C81.21 MIXED CELLULARITY HODGKIN LYMPHOMA OF LYMPH NODES OF NECK: Primary | ICD-10-CM

## 2022-03-09 LAB
ALBUMIN SERPL-MCNC: 4.4 G/DL (ref 3.5–5.2)
ALBUMIN/GLOB SERPL: 1.9 G/DL (ref 1.1–2.4)
ALP SERPL-CCNC: 51 U/L (ref 38–116)
ALT SERPL W P-5'-P-CCNC: 15 U/L (ref 0–33)
ANION GAP SERPL CALCULATED.3IONS-SCNC: 8.8 MMOL/L (ref 5–15)
AST SERPL-CCNC: 18 U/L (ref 0–32)
BASOPHILS # BLD AUTO: 0.03 10*3/MM3 (ref 0–0.2)
BASOPHILS NFR BLD AUTO: 0.6 % (ref 0–1.5)
BILIRUB SERPL-MCNC: 0.3 MG/DL (ref 0.2–1.2)
BUN SERPL-MCNC: 6 MG/DL (ref 6–20)
BUN/CREAT SERPL: 7.4 (ref 7.3–30)
CALCIUM SPEC-SCNC: 9.2 MG/DL (ref 8.5–10.2)
CHLORIDE SERPL-SCNC: 105 MMOL/L (ref 98–107)
CO2 SERPL-SCNC: 28.2 MMOL/L (ref 22–29)
CREAT SERPL-MCNC: 0.81 MG/DL (ref 0.6–1.1)
DEPRECATED RDW RBC AUTO: 39.2 FL (ref 37–54)
EGFRCR SERPLBLD CKD-EPI 2021: 93.7 ML/MIN/1.73
EOSINOPHIL # BLD AUTO: 0.14 10*3/MM3 (ref 0–0.4)
EOSINOPHIL NFR BLD AUTO: 2.6 % (ref 0.3–6.2)
ERYTHROCYTE [DISTWIDTH] IN BLOOD BY AUTOMATED COUNT: 12.3 % (ref 12.3–15.4)
GLOBULIN UR ELPH-MCNC: 2.3 GM/DL (ref 1.8–3.5)
GLUCOSE SERPL-MCNC: 89 MG/DL (ref 74–124)
HCT VFR BLD AUTO: 38.7 % (ref 34–46.6)
HGB BLD-MCNC: 13.7 G/DL (ref 12–15.9)
IMM GRANULOCYTES # BLD AUTO: 0.01 10*3/MM3 (ref 0–0.05)
IMM GRANULOCYTES NFR BLD AUTO: 0.2 % (ref 0–0.5)
LYMPHOCYTES # BLD AUTO: 2.04 10*3/MM3 (ref 0.7–3.1)
LYMPHOCYTES NFR BLD AUTO: 38.4 % (ref 19.6–45.3)
MCH RBC QN AUTO: 30.9 PG (ref 26.6–33)
MCHC RBC AUTO-ENTMCNC: 35.4 G/DL (ref 31.5–35.7)
MCV RBC AUTO: 87.4 FL (ref 79–97)
MONOCYTES # BLD AUTO: 0.48 10*3/MM3 (ref 0.1–0.9)
MONOCYTES NFR BLD AUTO: 9 % (ref 5–12)
NEUTROPHILS NFR BLD AUTO: 2.61 10*3/MM3 (ref 1.7–7)
NEUTROPHILS NFR BLD AUTO: 49.2 % (ref 42.7–76)
NRBC BLD AUTO-RTO: 0 /100 WBC (ref 0–0.2)
PLATELET # BLD AUTO: 226 10*3/MM3 (ref 140–450)
PMV BLD AUTO: 8.4 FL (ref 6–12)
POTASSIUM SERPL-SCNC: 3.9 MMOL/L (ref 3.5–4.7)
PROT SERPL-MCNC: 6.7 G/DL (ref 6.3–8)
RBC # BLD AUTO: 4.43 10*6/MM3 (ref 3.77–5.28)
SODIUM SERPL-SCNC: 142 MMOL/L (ref 134–145)
T4 FREE SERPL-MCNC: 1.45 NG/DL (ref 0.93–1.7)
TSH SERPL DL<=0.05 MIU/L-ACNC: 0.18 UIU/ML (ref 0.27–4.2)
WBC NRBC COR # BLD: 5.31 10*3/MM3 (ref 3.4–10.8)

## 2022-03-09 PROCEDURE — 99214 OFFICE O/P EST MOD 30 MIN: CPT | Performed by: INTERNAL MEDICINE

## 2022-03-09 PROCEDURE — 84439 ASSAY OF FREE THYROXINE: CPT | Performed by: INTERNAL MEDICINE

## 2022-03-09 PROCEDURE — 80050 GENERAL HEALTH PANEL: CPT

## 2022-03-09 PROCEDURE — 36415 COLL VENOUS BLD VENIPUNCTURE: CPT

## 2022-03-09 NOTE — PROGRESS NOTES
Subjective   REASONS FOR FOLLOWUP:     1. Relapsed Hodgkin disease, now status post high dose therapy and autologous stem cell rescue at [place] on 01/28/2011.   2. Radiation therapy to the left neck and supraclavicular area at [facility] with Dr. Wilkerson completed 04/28/2011.   3. Ovarian failure following chemotherapy currently on hormone replacement and following up with Dr. Concetta Duncan, OB-GYN.   4. Hypothyroidism on Synthroid replacement.     HISTORY OF PRESENT ILLNESS:   Linda is a 41 y.o. female with the above-noted history of recurrent Hodgkin disease treated with high-dose therapy and stem cell transplant at [facility] in January 2011. Following her transplant, she also received radiation therapy to the left neck and supraclavicular area which she completed in April 2011.    She is here today for routine annual  followup and seems to be doing well now 11 years out from her stem cell transplant.  She is training for the marathon currently.  She did have some complaints of dizziness and was evaluated by Dr. Delia Low of cardiology without any evidence of significant cardiac issues on her work-up.    She is currently scheduled for her screening mammogram in April.    Her last TSH level and free T4 from 3/1/2021 were within normal limits.  Repeat labs from today are pending.     History of Present Illness      Past Medical History, Past Surgical History, Social History, Family History have been reviewed and are without significant changes except as mentioned.    Review of Systems   Constitutional: Negative for activity change, appetite change, fatigue, fever and unexpected weight change.   HENT: Negative for hearing loss, nosebleeds, trouble swallowing and voice change.    Eyes: Negative for visual disturbance.   Respiratory: Negative for cough, shortness of breath and wheezing.    Cardiovascular: Negative for chest pain and palpitations.   Gastrointestinal:  "Negative for abdominal pain, diarrhea, nausea and vomiting.   Genitourinary: Negative for difficulty urinating, frequency, hematuria and urgency.   Musculoskeletal: Negative for back pain and neck pain.   Skin: Negative for rash.   Neurological: Negative for dizziness, seizures, syncope and headaches.   Hematological: Negative for adenopathy. Does not bruise/bleed easily.   Psychiatric/Behavioral: Negative for behavioral problems. The patient is not nervous/anxious.       A comprehensive 14 point review of systems was performed and was negative except as mentioned.    Medications:  The current medication list was reviewed in the EMR    ALLERGIES:  No Known Allergies    Objective      Vitals:    03/09/22 0849   BP: 111/76   Pulse: 72   Resp: 18   Temp: 97.7 °F (36.5 °C)   TempSrc: Temporal   SpO2: 98%   Weight: 92.3 kg (203 lb 8 oz)   Height: 182.9 cm (72.01\")   PainSc: 0-No pain     Current Status 3/9/2022   ECOG score 0       Physical Exam   Constitutional: She is oriented to person, place, and time. She appears well-developed. No distress.   HENT:   Head: Normocephalic.   Eyes: Pupils are equal, round, and reactive to light. Conjunctivae are normal. No scleral icterus.   Neck: No JVD present. No thyromegaly present.   Cardiovascular: Normal rate and regular rhythm. Exam reveals no gallop and no friction rub.   No murmur heard.  Pulmonary/Chest: Effort normal and breath sounds normal. She has no wheezes. She has no rales.   Abdominal: Soft. She exhibits no distension and no mass. There is no abdominal tenderness.   Musculoskeletal: Normal range of motion. No deformity.   Lymphadenopathy:     She has no cervical adenopathy.   Neurological: She is alert and oriented to person, place, and time. She has normal reflexes. No cranial nerve deficit.   Skin: Skin is warm and dry. No rash noted. No erythema.   Psychiatric: Her behavior is normal. Judgment normal.         RECENT LABS:  Hematology WBC   Date Value Ref Range " Status   03/09/2022 5.31 3.40 - 10.80 10*3/mm3 Final     RBC   Date Value Ref Range Status   03/09/2022 4.43 3.77 - 5.28 10*6/mm3 Final     Hemoglobin   Date Value Ref Range Status   03/09/2022 13.7 12.0 - 15.9 g/dL Final     Hematocrit   Date Value Ref Range Status   03/09/2022 38.7 34.0 - 46.6 % Final     Platelets   Date Value Ref Range Status   03/09/2022 226 140 - 450 10*3/mm3 Final            Lab Results   Component Value Date    TSH 0.637 03/01/2021       Assessment/Plan   ASSESSMENT:   1. Hodgkin disease in remission following high dose therapy and autologous stem cell transplant at [place] in late January 2011. The patient continues to do well with no evidence of disease, now over 11 years out from her transplant.   2. Post-transplant consolidation radiation left neck and supraclavicular area completed 04/28/2011.   3. Ovarian failure following high-dose chemotherapy. The patient continues to followup with her OB/GYN, Dr. Concetta Duncan.                   4. Hypothyroidism, currently on Synthroid replacement.           PLAN:   1. Eloy will be scheduled to return for routine follow-up in 12 months  2. Since her initial chest irradiation was in 2010 she started breast imaging in 2019 when she was 38.  Her next screening mammogram is due in April.  3. We will continue to monitor her thyroid studies and adjust her Synthroid as needed.  She tells me she is looking for a primary care physician.  4. She initially just had a CBC performed on the visit today but we will send her back to the lab today for serum chemistries and thyroid studies.                  3/9/2022      CC:

## 2022-03-21 RX ORDER — LEVOTHYROXINE SODIUM 0.1 MG/1
TABLET ORAL
Qty: 90 TABLET | Refills: 2 | Status: SHIPPED | OUTPATIENT
Start: 2022-03-21 | End: 2023-02-06

## 2022-05-05 ENCOUNTER — OFFICE VISIT (OUTPATIENT)
Dept: ORTHOPEDIC SURGERY | Facility: CLINIC | Age: 42
End: 2022-05-05

## 2022-05-05 ENCOUNTER — HOSPITAL ENCOUNTER (OUTPATIENT)
Dept: CARDIOLOGY | Facility: HOSPITAL | Age: 42
Discharge: HOME | End: 2022-05-05
Admitting: NURSE PRACTITIONER

## 2022-05-05 VITALS — TEMPERATURE: 98.4 F | BODY MASS INDEX: 27.22 KG/M2 | HEIGHT: 72 IN | RESPIRATION RATE: 18 BRPM | WEIGHT: 201 LBS

## 2022-05-05 DIAGNOSIS — M79.89 CALF SWELLING: ICD-10-CM

## 2022-05-05 DIAGNOSIS — M77.9 TENDONITIS: ICD-10-CM

## 2022-05-05 DIAGNOSIS — M25.572 LEFT ANKLE PAIN, UNSPECIFIED CHRONICITY: Primary | ICD-10-CM

## 2022-05-05 DIAGNOSIS — M25.572 LEFT ANKLE PAIN, UNSPECIFIED CHRONICITY: ICD-10-CM

## 2022-05-05 LAB
BH CV LOWER VASCULAR LEFT COMMON FEMORAL AUGMENT: NORMAL
BH CV LOWER VASCULAR LEFT COMMON FEMORAL COMPETENT: NORMAL
BH CV LOWER VASCULAR LEFT COMMON FEMORAL COMPRESS: NORMAL
BH CV LOWER VASCULAR LEFT COMMON FEMORAL PHASIC: NORMAL
BH CV LOWER VASCULAR LEFT COMMON FEMORAL SPONT: NORMAL
BH CV LOWER VASCULAR LEFT DISTAL FEMORAL COMPRESS: NORMAL
BH CV LOWER VASCULAR LEFT GASTRONEMIUS COMPRESS: NORMAL
BH CV LOWER VASCULAR LEFT GREATER SAPH AK COMPRESS: NORMAL
BH CV LOWER VASCULAR LEFT GREATER SAPH BK COMPRESS: NORMAL
BH CV LOWER VASCULAR LEFT LESSER SAPH COMPRESS: NORMAL
BH CV LOWER VASCULAR LEFT MID FEMORAL AUGMENT: NORMAL
BH CV LOWER VASCULAR LEFT MID FEMORAL COMPETENT: NORMAL
BH CV LOWER VASCULAR LEFT MID FEMORAL COMPRESS: NORMAL
BH CV LOWER VASCULAR LEFT MID FEMORAL PHASIC: NORMAL
BH CV LOWER VASCULAR LEFT MID FEMORAL SPONT: NORMAL
BH CV LOWER VASCULAR LEFT PERONEAL COMPRESS: NORMAL
BH CV LOWER VASCULAR LEFT POPLITEAL AUGMENT: NORMAL
BH CV LOWER VASCULAR LEFT POPLITEAL COMPETENT: NORMAL
BH CV LOWER VASCULAR LEFT POPLITEAL COMPRESS: NORMAL
BH CV LOWER VASCULAR LEFT POPLITEAL PHASIC: NORMAL
BH CV LOWER VASCULAR LEFT POPLITEAL SPONT: NORMAL
BH CV LOWER VASCULAR LEFT POSTERIOR TIBIAL COMPRESS: NORMAL
BH CV LOWER VASCULAR LEFT PROFUNDA FEMORAL COMPRESS: NORMAL
BH CV LOWER VASCULAR LEFT PROXIMAL FEMORAL COMPRESS: NORMAL
BH CV LOWER VASCULAR LEFT SAPHENOFEMORAL JUNCTION COMPRESS: NORMAL
BH CV LOWER VASCULAR LEFT SOLEAL COMPRESS: NORMAL
BH CV LOWER VASCULAR RIGHT COMMON FEMORAL AUGMENT: NORMAL
BH CV LOWER VASCULAR RIGHT COMMON FEMORAL COMPETENT: NORMAL
BH CV LOWER VASCULAR RIGHT COMMON FEMORAL COMPRESS: NORMAL
BH CV LOWER VASCULAR RIGHT COMMON FEMORAL PHASIC: NORMAL
BH CV LOWER VASCULAR RIGHT COMMON FEMORAL SPONT: NORMAL
MAXIMAL PREDICTED HEART RATE: 178 BPM
STRESS TARGET HR: 151 BPM

## 2022-05-05 PROCEDURE — 99214 OFFICE O/P EST MOD 30 MIN: CPT | Performed by: NURSE PRACTITIONER

## 2022-05-05 PROCEDURE — 93971 EXTREMITY STUDY: CPT

## 2022-05-05 PROCEDURE — 73610 X-RAY EXAM OF ANKLE: CPT | Performed by: NURSE PRACTITIONER

## 2022-05-05 NOTE — PROGRESS NOTES
Patient Name: Linda Marrufo   YOB: 1980  Referring Primary Care Physician: Provider, No Known  BMI: Body mass index is 27.26 kg/m².    Chief Complaint:    Chief Complaint   Patient presents with   • Left Ankle - Pain, Initial Evaluation, Edema        HPI: New pt that ran the marathon sat after training for a half marathon and is having pain in left ankle. Pain started Sunday night and she iced and took advil. Pain and swelling in left ankle and worse with standing and walking. No hx of injury or trauma.   No hx of covid - remote hx of lymphoma in remission since 2011.   Linda Marrufo is a 42 y.o. female who presents today for evaluation of   Chief Complaint   Patient presents with   • Left Ankle - Pain, Initial Evaluation, Edema         Subjective   Medications:   Home Medications:  Current Outpatient Medications on File Prior to Visit   Medication Sig   • levothyroxine (SYNTHROID, LEVOTHROID) 100 MCG tablet TAKE 1 TABLET BY MOUTH EVERY MORNING BEFORE BREAKFAST   • promethazine (PHENERGAN) 25 MG tablet Take 1 tablet by mouth Every 6 (Six) Hours As Needed for Nausea or Vomiting.     No current facility-administered medications on file prior to visit.     Current Medications:  Scheduled Meds:  Continuous Infusions:No current facility-administered medications for this visit.    PRN Meds:.    I have reviewed the patient's medical history in detail and updated the computerized patient record.  Review and summarization of old records includes:    Past Medical History:   Diagnosis Date   • HA (headache)    • History of ovarian cyst    • History of weight gain    • Hodgkin disease (HCC)     RELAPSED   • Hypothyroidism    • Lymphoma (HCC)    • Menopause    • Ovarian failure due to cancer therapy         Past Surgical History:   Procedure Laterality Date   • HERNIA REPAIR      PT WAS 5 YO   • OTHER SURGICAL HISTORY Left 2010    Insertion trifusion cath        Social History     Occupational History      Employer: [email or web address]   Tobacco Use   • Smoking status: Never Smoker   • Smokeless tobacco: Never Used   Vaping Use   • Vaping Use: Never used   Substance and Sexual Activity   • Alcohol use: Yes     Alcohol/week: 1.0 standard drink     Types: 1 Standard drinks or equivalent per week     Comment: OCCASIONAL   • Drug use: No   • Sexual activity: Not Currently     Partners: Male     Birth control/protection: Condom, None      Social History     Social History Narrative   • Not on file        Family History   Problem Relation Age of Onset   • No Known Problems Mother    • Diabetes Father    • Stroke Father    • No Known Problems Sister    • No Known Problems Brother    • No Known Problems Maternal Aunt    • No Known Problems Maternal Uncle    • Other Paternal Aunt    • No Known Problems Paternal Uncle    • No Known Problems Maternal Grandmother    • No Known Problems Maternal Grandfather    • No Known Problems Paternal Grandmother    • No Known Problems Paternal Grandfather    • Cancer Neg Hx        ROS: 14 point review of systems was performed and all other systems were reviewed and are negative except for documented findings in HPI and today's encounter.     Allergies: No Known Allergies  Constitutional:  Denies fever, shaking or chills   Eyes:  Denies change in visual acuity   HENT:  Denies nasal congestion or sore throat   Respiratory:  Denies cough or shortness of breath   Cardiovascular:  Denies chest pain or severe LE edema   GI:  Denies abdominal pain, nausea, vomiting, bloody stools or diarrhea   Musculoskeletal:  Numbness, tingling, pain, or loss of motor function only as noted above in history of present illness.  : Denies painful urination or hematuria  Integument:  Denies rash, lesion or ulceration   Neurologic:  Denies headache or focal weakness  Endocrine:  Denies lymphadenopathy  Psych:  Denies confusion or change in mental status   Hem:  Denies active bleeding    OBJECTIVE:  Physical Exam: 42 y.o.  "female  Wt Readings from Last 3 Encounters:   05/16/22 91.2 kg (201 lb)   05/05/22 91.2 kg (201 lb)   03/09/22 92.3 kg (203 lb 8 oz)     Ht Readings from Last 1 Encounters:   05/16/22 182.9 cm (72.01\")     Body mass index is 27.26 kg/m².  Vitals:    05/05/22 0908   Resp: 18   Temp: 98.4 °F (36.9 °C)     Vital signs reviewed.     General Appearance:    Alert, cooperative, in no acute distress                  Eyes: conjunctiva clear  ENT: external ears and nose atraumatic  CV: no peripheral edema  Resp: normal respiratory effort  Skin: no rashes or wounds; normal turgor  Psych: mood and affect appropriate  Lymph: no nodes appreciated  Neuro: gross sensation intact  Vascular:  Palpable peripheral pulse in noted extremity  Musculoskeletal Extremities: Skin warm dry intact with good pulses movement sensation compartments are soft calf is slight tender to palpation she has tenderness over the medial and lateral malleolus base of fifth metatarsals nontender posterior tibial tendon slight tender Achilles is intact she ambulates with a slight antalgic gait    Radiology:   Left ankle 3 views done for pain with no comparison views no acute fracture    Assessment:     ICD-10-CM ICD-9-CM   1. Left ankle pain, unspecified chronicity  M25.572 719.47   2. Calf swelling  M79.89 729.81   3. Tendonitis  M77.9 726.90        Procedures    Will check a Doppler put her in a tall fracture walker boot with a heel lift off work for today follow-up with sports medicine feel this is probably overuse may be posterior tibial tendon but with the swelling in recent running we will just check a Doppler compartments are soft if she has any increased swelling or tightness in the ankle or calf she is to call go emergency room  MDM/Plan:   The diagnosis(es), natural history, pathophysiology and treatment for diagnosis(es) were discussed. Opportunity given and questions answered.  Biomechanics of pertinent body areas discussed.  When appropriate, the " use of ambulatory aids discussed.  EXERCISES:  Advice on benefits of, and types of regular/moderate exercise pertaining to orthopedic diagnosis(es).  MEDICATIONS:  The risks, benefits, warnings,side effects and alternatives of medications discussed.  Inflammation/pain control; with cold, heat, elevation and/or liniments discussed as appropriate  SPECIALTY REFERRAL  MEDICAL RECORDS reviewed from other provider(s) for past and current medical history pertinent to this complaint.      5/25/2022    Much of this encounter note is an electronic transcription/translation of spoken language to printed text. The electronic translation of spoken language may permit erroneous, or at times, nonsensical words or phrases to be inadvertently transcribed; Although I have reviewed the note for such errors, some may still exist

## 2022-05-14 ENCOUNTER — TELEPHONE (OUTPATIENT)
Dept: CARDIOLOGY | Facility: CLINIC | Age: 42
End: 2022-05-14

## 2022-05-16 ENCOUNTER — OFFICE VISIT (OUTPATIENT)
Dept: SPORTS MEDICINE | Facility: CLINIC | Age: 42
End: 2022-05-16

## 2022-05-16 VITALS
BODY MASS INDEX: 27.22 KG/M2 | HEIGHT: 72 IN | RESPIRATION RATE: 16 BRPM | HEART RATE: 90 BPM | OXYGEN SATURATION: 99 % | TEMPERATURE: 97.7 F | SYSTOLIC BLOOD PRESSURE: 102 MMHG | WEIGHT: 201 LBS | DIASTOLIC BLOOD PRESSURE: 64 MMHG

## 2022-05-16 DIAGNOSIS — M25.572 ACUTE LEFT ANKLE PAIN: Primary | ICD-10-CM

## 2022-05-16 PROCEDURE — 99213 OFFICE O/P EST LOW 20 MIN: CPT | Performed by: FAMILY MEDICINE

## 2022-05-16 NOTE — PROGRESS NOTES
"Linda is a 42 y.o. year old female     Chief Complaint   Patient presents with   • Left Ankle - Pain, Initial Evaluation     Referral from [facility] for eval of LT ankle pain with sprain - no direct injury to the ankle, ran a marathon 04/30/2022, pain and swelling started following Monday - placed in tall cam walker - x-rays and doppler done on 05/05/2022 - here for further evaluation and treatment        History of Present Illness  HPI   Today for acute left ankle pain.  She initially fell without obvious injury to the ankle but had significant abrasions on the right knee, then ran a marathon a few days later.  Her pain progressed after that became rather severe.  She was seen on 5/5/2022 with x-rays also a Doppler ultrasound that was negative for possible DVT.  Placed in aboot and is gradually getting better.    Review of Systems    /64 (BP Location: Left arm, Patient Position: Sitting, Cuff Size: Adult)   Pulse 90   Temp 97.7 °F (36.5 °C) (Temporal)   Resp 16   Ht 182.9 cm (72.01\")   Wt 91.2 kg (201 lb)   SpO2 99%   BMI 27.25 kg/m²      Physical Exam    Vital signs reviewed.   General: No acute distress.      MSK Exam:  Ortho Exam  Left ankle: Normal appearance.  There is very minimal tenderness to palpation of the sinus Tarsi.  There is some very subtle soft tissue swelling in the same area.  There is normal range of motion.  Normal strength.  No ligamentous laxity noted.    Reviewed previous left ankle x-rays taken 5/5/2022, normal.      Diagnoses and all orders for this visit:    Acute left ankle pain    Overall appears that she had a short-lived injury that has since resolved, possibly compensatory pain due to avoiding the pain in her right leg.  Okay to transition out of boot to supportive footwear and advance as tolerated.  Discussed protective strengthening, proprioception, etc.  "

## 2022-05-24 ENCOUNTER — TELEPHONE (OUTPATIENT)
Dept: ONCOLOGY | Facility: CLINIC | Age: 42
End: 2022-05-24

## 2022-05-24 DIAGNOSIS — Z00.00 ENCOUNTER FOR GENERAL MEDICAL EXAMINATION: ICD-10-CM

## 2022-05-24 DIAGNOSIS — R42 DIZZY SPELLS: ICD-10-CM

## 2022-05-24 DIAGNOSIS — R42 LIGHTHEADED: Primary | ICD-10-CM

## 2022-05-24 NOTE — TELEPHONE ENCOUNTER
"Returned call to patient who Is reporting that she has been having ongoing lightheadedness and feels like her brain is a little \"foggy\" at times.  She has been on Levaquin 100 mg daily for over a year. She is wondering if this could be causing the symptoms.  She has seen a Cardiologist and Cardiology ruled out any cardiac issues.  Please advise if she has been taking the Levaquin too long or what she should do.  Please advise.  "

## 2022-05-25 NOTE — TELEPHONE ENCOUNTER
Spoke with patient and reviewed Dr. Bhatt's recommendation to see her PCP regarding symptoms as her Levothyroxine is for hypothyroidism and her thyroid labs are currently stable. She v/u and requested a PCP referral. RN will place referral.

## 2022-06-03 ENCOUNTER — OFFICE VISIT (OUTPATIENT)
Dept: OBSTETRICS AND GYNECOLOGY | Age: 42
End: 2022-06-03

## 2022-06-03 VITALS
WEIGHT: 196.6 LBS | HEIGHT: 72 IN | SYSTOLIC BLOOD PRESSURE: 98 MMHG | DIASTOLIC BLOOD PRESSURE: 62 MMHG | BODY MASS INDEX: 26.63 KG/M2

## 2022-06-03 DIAGNOSIS — N64.4 BREAST PAIN, LEFT: Primary | ICD-10-CM

## 2022-06-03 DIAGNOSIS — N63.23 BREAST LUMP ON LEFT SIDE AT 5 O'CLOCK POSITION: ICD-10-CM

## 2022-06-03 PROCEDURE — 99213 OFFICE O/P EST LOW 20 MIN: CPT | Performed by: NURSE PRACTITIONER

## 2022-06-03 NOTE — PROGRESS NOTES
[facility]   Obstetrics and Gynecology     6/3/2022      Patient:  Linda Marrufo   MR#:[id number]    Office note    Chief Complaint   Patient presents with   • Gynecologic Exam     Pt c/o of outside/underneath left breast pain. Shooting pain/tender to the touch.        Subjective     History of Present Illness  42 y.o. female  presents for evaluation of left breast pain. MXR 2021 WNL. She noticed the breast pain yesterday, it was sudden onset, sharp shooting pain from axilla to nipple in lateral breast, around 5:00. No masses felt. She has hx of stage 2 hodgkin's in 2016 and had radiation to her L neck. She is concerned for recurrence to her breast.         Relevant data reviewed:      Patient Active Problem List   Diagnosis   • Hodgkin's disease (HCC)   • Ovarian failure due to cancer therapy   • Hypothyroidism, acquired   • Lightheadedness   • Vasovagal symptom       Past Medical History:   Diagnosis Date   • HA (headache)    • History of ovarian cyst    • History of weight gain    • Hodgkin disease (HCC)     RELAPSED   • Hypothyroidism    • Lymphoma (HCC)    • Menopause    • Ovarian failure due to cancer therapy      Past Surgical History:   Procedure Laterality Date   • HERNIA REPAIR      PT WAS 3 YO   • OTHER SURGICAL HISTORY Left     Insertion trifusion cath     Obstetric History:  OB History        0    Para   0    Term   0       0    AB   0    Living   0       SAB   0    IAB   0    Ectopic   0    Molar        Multiple   0    Live Births                   Menstrual History:     No LMP recorded (lmp unknown). (Menstrual status: Chemotherapy/radiation).       The patient has never been pregnant.  Family History   Problem Relation Age of Onset   • No Known Problems Mother    • Diabetes Father    • Stroke Father    • No Known Problems Sister    • No Known Problems Brother    • No Known Problems Maternal Aunt    • No Known Problems Maternal Uncle    • Other Paternal  "Aunt    • No Known Problems Paternal Uncle    • No Known Problems Maternal Grandmother    • No Known Problems Maternal Grandfather    • No Known Problems Paternal Grandmother    • No Known Problems Paternal Grandfather    • Cancer Neg Hx      Social History     Tobacco Use   • Smoking status: Never Smoker   • Smokeless tobacco: Never Used   Vaping Use   • Vaping Use: Never used   Substance Use Topics   • Alcohol use: Yes     Alcohol/week: 1.0 standard drink     Types: 1 Standard drinks or equivalent per week     Comment: OCCASIONAL   • Drug use: No     Patient has no known allergies.    Current Outpatient Medications:   •  levothyroxine (SYNTHROID, LEVOTHROID) 100 MCG tablet, TAKE 1 TABLET BY MOUTH EVERY MORNING BEFORE BREAKFAST, Disp: 90 tablet, Rfl: 2  •  promethazine (PHENERGAN) 25 MG tablet, Take 1 tablet by mouth Every 6 (Six) Hours As Needed for Nausea or Vomiting., Disp: 15 tablet, Rfl: 0    The following portions of the patient's history were reviewed and updated as appropriate: allergies, current medications, past family history, past medical history, past social history, past surgical history, and problem list.    Review of Systems   Constitutional: Negative for activity change, appetite change, chills, fatigue and fever.   Respiratory: Negative for cough and shortness of breath.    Cardiovascular: Negative for chest pain.   Gastrointestinal: Negative for constipation, diarrhea, nausea and vomiting.   Genitourinary: Negative for dysuria, flank pain, genital sores, hematuria, menstrual problem and vaginal bleeding.       BP Readings from Last 3 Encounters:   06/03/22 98/62   05/16/22 102/64   03/09/22 111/76      Wt Readings from Last 3 Encounters:   06/03/22 89.2 kg (196 lb 9.6 oz)   05/16/22 91.2 kg (201 lb)   05/05/22 91.2 kg (201 lb)      BMI: Estimated body mass index is 26.66 kg/m² as calculated from the following:    Height as of this encounter: 182.9 cm (72\").    Weight as of this encounter: 89.2 kg " "(196 lb 9.6 oz). BSA: Estimated body surface area is 2.12 meters squared as calculated from the following:    Height as of this encounter: 182.9 cm (72\").    Weight as of this encounter: 89.2 kg (196 lb 9.6 oz).    Objective   Physical Exam  Vitals reviewed.   Constitutional:       Appearance: Normal appearance. She is normal weight.   HENT:      Head: Normocephalic and atraumatic.      Nose: Nose normal.      Mouth/Throat:      Mouth: Mucous membranes are moist.   Eyes:      Pupils: Pupils are equal, round, and reactive to light.   Pulmonary:      Effort: Pulmonary effort is normal.   Chest:       Abdominal:      General: Abdomen is flat.      Palpations: Abdomen is soft.   Musculoskeletal:         General: Normal range of motion.      Cervical back: Normal range of motion and neck supple.   Skin:     General: Skin is warm and dry.   Neurological:      Mental Status: She is alert and oriented to person, place, and time.         Assessment & Plan     Diagnoses and all orders for this visit:    1. Breast pain, left (Primary)  -     Mammo Diagnostic Bilateral With CAD; Future  -     US breast left complete; Future    2. Breast lump on left side at 5 o'clock position         Return if symptoms worsen or fail to improve.    Apoorva Bobo, S[facility]   6/3/2022 13:48 EDT  "

## 2022-06-20 ENCOUNTER — APPOINTMENT (OUTPATIENT)
Dept: WOMENS IMAGING | Facility: HOSPITAL | Age: 42
End: 2022-06-20

## 2022-06-20 PROCEDURE — 76641 ULTRASOUND BREAST COMPLETE: CPT | Performed by: RADIOLOGY

## 2022-06-20 PROCEDURE — 77062 BREAST TOMOSYNTHESIS BI: CPT | Performed by: RADIOLOGY

## 2022-06-20 PROCEDURE — G0279 TOMOSYNTHESIS, MAMMO: HCPCS | Performed by: RADIOLOGY

## 2022-06-20 PROCEDURE — 77066 DX MAMMO INCL CAD BI: CPT | Performed by: RADIOLOGY

## 2022-07-07 ENCOUNTER — OFFICE VISIT (OUTPATIENT)
Dept: OBSTETRICS AND GYNECOLOGY | Age: 42
End: 2022-07-07

## 2022-07-07 ENCOUNTER — PROCEDURE VISIT (OUTPATIENT)
Dept: OBSTETRICS AND GYNECOLOGY | Age: 42
End: 2022-07-07

## 2022-07-07 VITALS
SYSTOLIC BLOOD PRESSURE: 108 MMHG | HEIGHT: 72 IN | DIASTOLIC BLOOD PRESSURE: 62 MMHG | WEIGHT: 185 LBS | BODY MASS INDEX: 25.06 KG/M2

## 2022-07-07 DIAGNOSIS — Z01.419 ENCOUNTER FOR GYNECOLOGICAL EXAMINATION WITHOUT ABNORMAL FINDING: Primary | ICD-10-CM

## 2022-07-07 DIAGNOSIS — Z78.0 POST-MENOPAUSAL: Primary | ICD-10-CM

## 2022-07-07 DIAGNOSIS — Z12.4 SCREENING FOR CERVICAL CANCER: ICD-10-CM

## 2022-07-07 DIAGNOSIS — Z11.51 SCREENING FOR HPV (HUMAN PAPILLOMAVIRUS): ICD-10-CM

## 2022-07-07 PROCEDURE — 77080 DXA BONE DENSITY AXIAL: CPT | Performed by: OBSTETRICS & GYNECOLOGY

## 2022-07-07 PROCEDURE — 99396 PREV VISIT EST AGE 40-64: CPT | Performed by: OBSTETRICS & GYNECOLOGY

## 2022-07-07 NOTE — PROGRESS NOTES
Routine Annual Visit    2022    Patient: Linda Marrufo          MR#:[id number]      Chief Complaint   Patient presents with   • Gynecologic Exam     Mammo 22, Last Mammo 4/15/21, Last Pap 19 (-), No concerns at this time       History of Present Illness    42 y.o. female  who presents for annual exam.     Feels well, lost weight over past year, regular exercise  Due for pap, mammo is UTD    Complains of   Dizzy spells- 3 in past year  ?HF, head hurts, has to lay down. Saw a cardiologist, 21 day heart monitor, stress test.   At a wedding, after a work out, sitting at work  Reviewed with pt, suspect vasovagal ?hypoglycemia, pt has been dieting  Also TSH running hyperthyroid  Recommend increase protein    No HF, NS  Vaginal dryness present, not currently SA  Reviewed HRT- pt without CI, but doesn't like to take pills and is actually doing quite well, no uncomfortable HF  Will check dexa scan    No LMP recorded (exact date). (Menstrual status: Chemotherapy/radiation).  Obstetric History:  OB History        0    Para   0    Term   0       0    AB   0    Living   0       SAB   0    IAB   0    Ectopic   0    Molar        Multiple   0    Live Births                   Menstrual History:     No LMP recorded (exact date). (Menstrual status: Chemotherapy/radiation).       Sexual History:       ________________________________________  Patient Active Problem List   Diagnosis   • Hodgkin's disease (HCC)   • Ovarian failure due to cancer therapy   • Hypothyroidism, acquired   • Lightheadedness   • Vasovagal symptom       Past Medical History:   Diagnosis Date   • HA (headache)    • History of ovarian cyst    • History of weight gain    • Hodgkin disease (HCC)     RELAPSED   • Hypothyroidism    • Lymphoma (HCC)    • Menopause    • Ovarian failure due to cancer therapy        Past Surgical History:   Procedure Laterality Date   • HERNIA REPAIR      PT WAS 3 YO   • OTHER SURGICAL HISTORY Left  "2010    Insertion trifusion cath       Social History     Tobacco Use   Smoking Status Never Smoker   Smokeless Tobacco Never Used       has a current medication list which includes the following prescription(s): levothyroxine and promethazine.  ________________________________________    Current contraception: abstinence  History of abnormal Pap smear: no  Family history of Breast cancer: no  Family history of uterine or ovarian cancer: no  Family History of colon cancer/colon polyps: no  History of abnormal mammogram: no      The following portions of the patient's history were reviewed and updated as appropriate: allergies, current medications, past family history, past medical history, past social history, past surgical history and problem list.    Review of Systems    Pertinent items are noted in HPI.     Objective   Physical Exam    /62   Ht 182.9 cm (72\")   Wt 83.9 kg (185 lb)   LMP  (Exact Date)   Breastfeeding No   BMI 25.09 kg/m²    BP Readings from Last 3 Encounters:   07/07/22 108/62   06/03/22 98/62   05/16/22 102/64      Wt Readings from Last 3 Encounters:   07/07/22 83.9 kg (185 lb)   06/03/22 89.2 kg (196 lb 9.6 oz)   05/16/22 91.2 kg (201 lb)      BMI: Estimated body mass index is 25.09 kg/m² as calculated from the following:    Height as of this encounter: 182.9 cm (72\").    Weight as of this encounter: 83.9 kg (185 lb).      General:   alert, appears stated age and cooperative   Abdomen: soft, non-tender, without masses or organomegaly   Breast: inspection negative, no nipple discharge or bleeding, no masses or nodularity palpable   Vulva: normal   Vagina: normal mucosa, atrophic cw menopause   Cervix: no cervical motion tenderness and no lesions   Uterus: normal size, mobile and non-tender   Adnexa: no mass, fullness, tenderness     Assessment:    1. Normal annual exam   Assessment     ICD-10-CM ICD-9-CM   1. Encounter for gynecological examination without abnormal finding  Z01.419 " V72.31   2. Screening for cervical cancer  Z12.4 V76.2   3. Screening for HPV (human papillomavirus)  Z11.51 V73.81     Plan:    Plan     []  Mammogram request made  [x]  PAP done  []  Labs:   []  GC/Chl/TV  []  DEXA scan   []  Referral for colonoscopy:       Diagnoses and all orders for this visit:    1. Encounter for gynecological examination without abnormal finding (Primary)    2. Screening for cervical cancer  -     IGP, Apt HPV,rfx 16 / 18,45    3. Screening for HPV (human papillomavirus)  -     IGP, Apt HPV,rfx 16 / 18,45      Check dexa- done today and reviewed, basically normal  Pt will consider HRT, not inclined to take at this point   Had taken ocps in past      Counseling:  --Nutrition: Stressed importance of moderation and caloric balance, stressed fresh fruit and vegetables  --Exercise: Stressed the importance of regular exercise. 3-5 times weekly   - Discussed screening mammogram recommendations.   --Discussed benefits of screening colonoscopy- age 45 unless FH  --Discussed pap smear screening recommendations

## 2022-07-08 ENCOUNTER — TELEPHONE (OUTPATIENT)
Dept: OBSTETRICS AND GYNECOLOGY | Age: 42
End: 2022-07-08

## 2022-07-08 NOTE — TELEPHONE ENCOUNTER
Placed call to review Dexa Sacan.  Results per Dr. Duncan; Very mild osteopenia in areas, mostly normal/good scan. Continue regular exercise.  Spoke with patient, relayed results. Patient stated understanding, no other questions at this time.

## 2022-07-11 LAB
CYTOLOGIST CVX/VAG CYTO: NORMAL
CYTOLOGY CVX/VAG DOC CYTO: NORMAL
CYTOLOGY CVX/VAG DOC THIN PREP: NORMAL
DX ICD CODE: NORMAL
HIV 1 & 2 AB SER-IMP: NORMAL
HPV I/H RISK 4 DNA CVX QL PROBE+SIG AMP: NEGATIVE
OTHER STN SPEC: NORMAL
STAT OF ADQ CVX/VAG CYTO-IMP: NORMAL

## 2022-08-09 DIAGNOSIS — R27.0 ATAXIA: ICD-10-CM

## 2022-08-09 DIAGNOSIS — R42 DIZZY SPELLS: Primary | ICD-10-CM

## 2022-08-09 DIAGNOSIS — R42 DIZZINESS: ICD-10-CM

## 2022-08-09 DIAGNOSIS — R42 LIGHTHEADED: ICD-10-CM

## 2022-08-09 DIAGNOSIS — C81.21 MIXED CELLULARITY HODGKIN LYMPHOMA OF LYMPH NODES OF NECK: ICD-10-CM

## 2022-08-09 NOTE — PROGRESS NOTES
Marshal Bhatt MD Guevara, Jeane Kathleen P, RN; Anita Mendenhall RegSched Rep  Please get her set up for MRI of the brain with and without IV contrast for evaluation of dizziness and ataxia     Orders being placed,  aware, will send a response to patient's [employer] message.

## 2022-08-29 ENCOUNTER — APPOINTMENT (OUTPATIENT)
Dept: MRI IMAGING | Facility: HOSPITAL | Age: 42
End: 2022-08-29

## 2022-09-07 ENCOUNTER — HOSPITAL ENCOUNTER (OUTPATIENT)
Dept: MRI IMAGING | Facility: HOSPITAL | Age: 42
Discharge: HOME | End: 2022-09-07
Admitting: INTERNAL MEDICINE

## 2022-09-07 DIAGNOSIS — R27.0 ATAXIA: ICD-10-CM

## 2022-09-07 DIAGNOSIS — R42 DIZZINESS: ICD-10-CM

## 2022-09-07 DIAGNOSIS — C81.21 MIXED CELLULARITY HODGKIN LYMPHOMA OF LYMPH NODES OF NECK: ICD-10-CM

## 2022-09-07 PROCEDURE — A9577 INJ MULTIHANCE: HCPCS | Performed by: INTERNAL MEDICINE

## 2022-09-07 PROCEDURE — 70553 MRI BRAIN STEM W/O & W/DYE: CPT

## 2022-09-07 PROCEDURE — 0 GADOBENATE DIMEGLUMINE 529 MG/ML SOLUTION: Performed by: INTERNAL MEDICINE

## 2022-09-07 RX ADMIN — GADOBENATE DIMEGLUMINE 17 ML: 529 INJECTION, SOLUTION INTRAVENOUS at 12:31

## 2022-09-08 ENCOUNTER — TELEPHONE (OUTPATIENT)
Dept: ONCOLOGY | Facility: CLINIC | Age: 42
End: 2022-09-08

## 2022-09-08 NOTE — TELEPHONE ENCOUNTER
Discussed with patient her MRI results, informed her that there was no brain tumor found, no acute intracranial abnormality seen, venous angioma, remainder of the MRI of the brain is normal. Will send her informational link from [facility] and [employer] via [employer]. Patient has not found a PCP yet and hence gave her to contact [phone]. Will inform her of Dr Bhatt's advise if there is any.

## 2022-09-08 NOTE — TELEPHONE ENCOUNTER
Caller: Linda Marrufo A    Relationship: Self    Best call back number: [phone]    What was the call regarding: PATIENT WANTED TO KNOW IF HER MRI RESULTS WERE IN? IF SO SHE WOULD LIKE A CALL TO GO OVER THE RESULTS       Do you require a callback: YES

## 2022-12-07 ENCOUNTER — OFFICE VISIT (OUTPATIENT)
Dept: ORTHOPEDIC SURGERY | Facility: CLINIC | Age: 42
End: 2022-12-07

## 2022-12-07 VITALS — TEMPERATURE: 97.3 F | WEIGHT: 170 LBS | HEIGHT: 72 IN | BODY MASS INDEX: 23.03 KG/M2

## 2022-12-07 DIAGNOSIS — S92.352A DISPLACED FRACTURE OF FIFTH METATARSAL BONE, LEFT FOOT, INITIAL ENCOUNTER FOR CLOSED FRACTURE: Primary | ICD-10-CM

## 2022-12-07 PROCEDURE — 99214 OFFICE O/P EST MOD 30 MIN: CPT | Performed by: NURSE PRACTITIONER

## 2022-12-07 NOTE — PROGRESS NOTES
Patient Name: Linda Marrufo   YOB: 1980  Referring Primary Care Physician: No primary care provider on file.  BMI: Body mass index is 23.06 kg/m².    Chief Complaint:    Chief Complaint   Patient presents with   • Right Foot - Pain        HPI: pt here for left foot pain after she slipped off a piliates table and hit foot on table 11-27-22 and has fracture to foot. Pt saw podiatry placed in a boot and sent here.  In remission lymphoma   Linda Marrufo is a 42 y.o. female who presents today for evaluation of   Chief Complaint   Patient presents with   • Right Foot - Pain         Subjective   Medications:   Home Medications:  Current Outpatient Medications on File Prior to Visit   Medication Sig   • levothyroxine (SYNTHROID, LEVOTHROID) 100 MCG tablet TAKE 1 TABLET BY MOUTH EVERY MORNING BEFORE BREAKFAST   • promethazine (PHENERGAN) 25 MG tablet Take 1 tablet by mouth Every 6 (Six) Hours As Needed for Nausea or Vomiting.     No current facility-administered medications on file prior to visit.     Current Medications:  Scheduled Meds:  Continuous Infusions:No current facility-administered medications for this visit.    PRN Meds:.    I have reviewed the patient's medical history in detail and updated the computerized patient record.  Review and summarization of old records includes:    Past Medical History:   Diagnosis Date   • HA (headache)    • History of ovarian cyst    • History of weight gain    • Hodgkin disease (HCC)     RELAPSED   • Hypothyroidism    • Lymphoma (HCC)    • Menopause    • Ovarian failure due to cancer therapy         Past Surgical History:   Procedure Laterality Date   • HERNIA REPAIR      PT WAS 3 YO   • OTHER SURGICAL HISTORY Left 2010    Insertion trifusion cath   • OTHER SURGICAL HISTORY      lymph node removed        Social History     Occupational History     Employer: [email or web address]   Tobacco Use   • Smoking status: Never   • Smokeless tobacco: Never   Vaping Use   •  Vaping Use: Never used   Substance and Sexual Activity   • Alcohol use: Yes     Alcohol/week: 1.0 standard drink     Types: 1 Standard drinks or equivalent per week     Comment: OCCASIONAL   • Drug use: No   • Sexual activity: Not Currently     Partners: Male     Birth control/protection: Condom, None      Social History     Social History Narrative   • Not on file        Family History   Problem Relation Age of Onset   • No Known Problems Mother    • Diabetes Father    • Stroke Father    • No Known Problems Sister    • No Known Problems Brother    • No Known Problems Maternal Aunt    • No Known Problems Maternal Uncle    • Other Paternal Aunt         sarcoma   • No Known Problems Paternal Uncle    • No Known Problems Maternal Grandmother    • No Known Problems Maternal Grandfather    • No Known Problems Paternal Grandmother    • No Known Problems Paternal Grandfather    • Cancer Neg Hx        ROS: 14 point review of systems was performed and all other systems were reviewed and are negative except for documented findings in HPI and today's encounter.     Allergies: No Known Allergies  Constitutional:  Denies fever, shaking or chills   Eyes:  Denies change in visual acuity   HENT:  Denies nasal congestion or sore throat   Respiratory:  Denies cough or shortness of breath   Cardiovascular:  Denies chest pain or severe LE edema   GI:  Denies abdominal pain, nausea, vomiting, bloody stools or diarrhea   Musculoskeletal:  Numbness, tingling, pain, or loss of motor function only as noted above in history of present illness.  : Denies painful urination or hematuria  Integument:  Denies rash, lesion or ulceration   Neurologic:  Denies headache or focal weakness  Endocrine:  Denies lymphadenopathy  Psych:  Denies confusion or change in mental status   Hem:  Denies active bleeding    OBJECTIVE:  Physical Exam: 42 y.o. female  Wt Readings from Last 3 Encounters:   12/07/22 77.1 kg (170 lb)   07/07/22 83.9 kg (185 lb)  "  06/03/22 89.2 kg (196 lb 9.6 oz)     Ht Readings from Last 1 Encounters:   12/07/22 182.9 cm (72\")     Body mass index is 23.06 kg/m².  Vitals:    12/07/22 0952   Temp: 97.3 °F (36.3 °C)     Vital signs reviewed.     General Appearance:    Alert, cooperative, in no acute distress                  Eyes: conjunctiva clear  ENT: external ears and nose atraumatic  CV: no peripheral edema  Resp: normal respiratory effort  Skin: no rashes or wounds; normal turgor  Psych: mood and affect appropriate  Lymph: no nodes appreciated  Neuro: gross sensation intact  Vascular:  Palpable peripheral pulse in noted extremity  Musculoskeletal Extremities: ttp fifth mt - skin warm, dry and intact with calf soft , no deformity and compartments soft, nvi and capp refill     Radiology:   Reviewed from images that are scanned in pacs - displaced metatarsal fifth oblique fracture     Assessment:     ICD-10-CM ICD-9-CM   1. Displaced fracture of fifth metatarsal bone, left foot, initial encounter for closed fracture  S92.352A 825.25        Procedures    Cam boot and non weight bearing and follow with MWM  MDM/Plan:   The diagnosis(es), natural history, pathophysiology and treatment for diagnosis(es) were discussed. Opportunity given and questions answered.  Biomechanics of pertinent body areas discussed.  When appropriate, the use of ambulatory aids discussed.  EXERCISES:  Advice on benefits of, and types of regular/moderate exercise pertaining to orthopedic diagnosis(es).  MEDICATIONS:  The risks, benefits, warnings,side effects and alternatives of medications discussed.  Inflammation/pain control; with cold, heat, elevation and/or liniments discussed as appropriate  SPECIALTY REFERRAL      12/7/2022    Much of this encounter note is an electronic transcription/translation of spoken language to printed text. The electronic translation of spoken language may permit erroneous, or at times, nonsensical words or phrases to be inadvertently " transcribed; Although I have reviewed the note for such errors, some may still exist

## 2022-12-12 ENCOUNTER — OFFICE VISIT (OUTPATIENT)
Dept: ORTHOPEDIC SURGERY | Facility: CLINIC | Age: 42
End: 2022-12-12

## 2022-12-12 ENCOUNTER — TELEPHONE (OUTPATIENT)
Dept: ORTHOPEDIC SURGERY | Facility: CLINIC | Age: 42
End: 2022-12-12

## 2022-12-12 VITALS — HEIGHT: 72 IN | BODY MASS INDEX: 24.65 KG/M2 | WEIGHT: 182 LBS | TEMPERATURE: 98.1 F

## 2022-12-12 DIAGNOSIS — S92.352A CLOSED DISPLACED FRACTURE OF FIFTH METATARSAL BONE OF LEFT FOOT, INITIAL ENCOUNTER: Primary | ICD-10-CM

## 2022-12-12 PROCEDURE — 99214 OFFICE O/P EST MOD 30 MIN: CPT | Performed by: ORTHOPAEDIC SURGERY

## 2022-12-12 PROCEDURE — 73630 X-RAY EXAM OF FOOT: CPT | Performed by: ORTHOPAEDIC SURGERY

## 2022-12-12 NOTE — PROGRESS NOTES
New Patient Complaint      Patient: Linda Marrufo  YOB: 1980 42 y.o. female  Medical Record Number: [id number]    Chief Complaints: I hurt my foot    History of Present Illness: Patient injured her left foot on 11/27/2022 when she fell off of some type of exercise tear.  She was subsequently seen by Dr. Valentin on the following day and placed into a boot and was told that he completely waited for surgical treatment or nonoperative treatment.  She subsequently saw Kay on 10/7/2022 and was instructed to continue with boot and crutches limiting weightbearing and is seen here today for further evaluation.  She has mild complaints of aching pain in the distal lateral aspect of the left foot but no specific complaints of pain at the ankle.      She is quite active as a  and plays volleyball several times per week and is an active runner.       HPI    Allergies: No Known Allergies    Medications:   Current Outpatient Medications on File Prior to Visit   Medication Sig   • levothyroxine (SYNTHROID, LEVOTHROID) 100 MCG tablet TAKE 1 TABLET BY MOUTH EVERY MORNING BEFORE BREAKFAST   • promethazine (PHENERGAN) 25 MG tablet Take 1 tablet by mouth Every 6 (Six) Hours As Needed for Nausea or Vomiting.     No current facility-administered medications on file prior to visit.       Past Medical History:   Diagnosis Date   • HA (headache)    • History of ovarian cyst    • History of weight gain    • Hodgkin disease (HCC)     RELAPSED   • Hypothyroidism    • Lymphoma (HCC)    • Menopause    • Ovarian failure due to cancer therapy      Past Surgical History:   Procedure Laterality Date   • HERNIA REPAIR      PT WAS 5 YO   • OTHER SURGICAL HISTORY Left 2010    Insertion trifusion cath   • OTHER SURGICAL HISTORY      lymph node removed     Social History     Occupational History     Employer: [email or web address]   Tobacco Use   • Smoking status: Never   • Smokeless tobacco: Never   Vaping Use   • Vaping  "Use: Never used   Substance and Sexual Activity   • Alcohol use: Yes     Alcohol/week: 1.0 standard drink     Types: 1 Standard drinks or equivalent per week     Comment: OCCASIONAL   • Drug use: No   • Sexual activity: Not Currently     Partners: Male     Birth control/protection: Condom, None      Social History     Social History Narrative   • Not on file     Family History   Problem Relation Age of Onset   • No Known Problems Mother    • Diabetes Father    • Stroke Father    • No Known Problems Sister    • No Known Problems Brother    • No Known Problems Maternal Aunt    • No Known Problems Maternal Uncle    • Other Paternal Aunt         sarcoma   • No Known Problems Paternal Uncle    • No Known Problems Maternal Grandmother    • No Known Problems Maternal Grandfather    • No Known Problems Paternal Grandmother    • No Known Problems Paternal Grandfather    • Cancer Neg Hx        Review of Systems: 14 point review of systems performed, positive pertinent findings identified in HPI. All remaining systems negative     Review of Systems      Physical Exam:   Vitals:    12/12/22 1105   Temp: 98.1 °F (36.7 °C)   Weight: 82.6 kg (182 lb)   Height: 182.9 cm (72\")     Physical Exam   Constitutional: pleasant, well developed   Eyes: sclera non icteric  Hearing : adequate for exam  Cardiovascular: palpable pulses in left foot, left calf/ thigh NT without sign of DVT  Respiratoy: breathing unlabored   Neurological: grossly sensate to LT throughout left LE  Psychiatric: oriented with normal mood and affect.   Lymphatic: No palpable popliteal lymphadenopathy left LE  Skin: intact throughout left leg/foot  Musculoskeletal: She has mild discomfort to palpation with mild swelling over the distal lateral aspect of the left forefoot.  She was nontender dorsum of the left midfoot and with without specific focal tenderness at the anterolateral ankle or peroneal tendons.  Physical Exam  Ortho Exam    Radiology: 3 views of the left " foot ordered evaluate fracture reviewed and compared to previous x-rays show an oblique fracture of the left distal fifth metatarsal with mild comminution.    Assessment/Plan: 1.  Left displaced oblique comminuted fifth metatarsal fracture    We discussed nonoperative versus operative treatment options.  From a nonoperative standpoint we can see how this does and could still require operative treatment down the road and from an operative standpoint we could proceed with operative fixation restore alignment and get some internal fixation which would allow possible earlier weightbearing and having to wait and see if this healed or not and then for possible forming surgery.    Other guarantees could be given decision made to proceed with operative treatment which I think is very reasonable given her active lifestyle the alignment and gapping of this fracture    She voiced clear understanding the operative procedure with known fixation using plates and screws and possible cadaver bone grafting with associated risk benefits potential outcomes and complications which can include or not limited to heart attack stroke death pneumonia infection bleeding damage to blood vessels nerves or tendons blood clots pulmonary embolism persistent or worsening pain stiffness need for subsequent surgery and failure to return to presurgery and precondition levels of activity as well as small risk of disease transmission from cadaver graft.    All her questions were answered to her full satisfaction she will continue with use of her boot doing only partial weightbearing if needed and instructed on use of a scooter and obtaining this.    Will get a CT scan for preoperative planning and plan to proceed with this on outpatient basis on 12/20/2022.  She was encouraged to call if she has any questions prior to surgery.

## 2022-12-12 NOTE — TELEPHONE ENCOUNTER
Caller: Linda Marrufo A    Relationship: Self    Best call back number:     What is the best time to reach you: ANY     Who are you requesting to speak with (clinical staff, provider,  specific staff member): CLINICAL    What was the call regarding: PATIENT TRIED TO GET CT SCHEDULED AND IS TOLD ITS NOT READY.  PLEASE ADVISE    Do you require a callback: YES

## 2022-12-13 ENCOUNTER — TELEPHONE (OUTPATIENT)
Dept: ONCOLOGY | Facility: CLINIC | Age: 42
End: 2022-12-13

## 2022-12-13 NOTE — TELEPHONE ENCOUNTER
Reviewed chart and returned call to patient with the information that she should only hold her Levothyroxine if she is NPO on the day of surgery and should resume the following day.  She v/u.

## 2022-12-13 NOTE — TELEPHONE ENCOUNTER
Caller: Linda Marrufo A    Relationship: Self    Best call back number: [phone]    What is the best time to reach you: ANYTIME    Who are you requesting to speak with (clinical staff, provider,  specific staff member): CLINICAL     What was the call regarding: PT CALLING SHE WILL BE HAVING FOOT SURGERY ON 12/20/2022 AND NEEDS TO KNOW IF ITS OKAY TO NOT TAKE HER LEVOTHYROXINE?    CALL TO DISCUSS    Do you require a callback: YES

## 2022-12-15 ENCOUNTER — TELEPHONE (OUTPATIENT)
Dept: ORTHOPEDIC SURGERY | Facility: CLINIC | Age: 42
End: 2022-12-15

## 2022-12-15 ENCOUNTER — HOSPITAL ENCOUNTER (OUTPATIENT)
Dept: CT IMAGING | Facility: HOSPITAL | Age: 42
End: 2022-12-15

## 2022-12-15 NOTE — TELEPHONE ENCOUNTER
Pt also sent a My Chart message to [facility] and was told it's OK to take Tylenol but nothing else.

## 2022-12-15 NOTE — TELEPHONE ENCOUNTER
Caller: RITA BLOUNT    Relationship to patient:SELF     Best call back number: [phone]    Patient is needing: PATIENT IS SCHEDULED FOR SURGERY- WANTS TO KNOW IF SHE IS ABLE TO TAKE TYLENOL FOR A HEADACHE AT THIS TIME?  HUB ATTEMPTED A WARM TRANSFER

## 2022-12-15 NOTE — TELEPHONE ENCOUNTER
Pt called while I was in the process on replying to her message.  I explained to her that she may take Tylenol only but no anti-inflammatories such as Advil or Aleve as they will thin her blood and she voiced understanding.

## 2022-12-16 ENCOUNTER — HOSPITAL ENCOUNTER (OUTPATIENT)
Dept: CT IMAGING | Facility: HOSPITAL | Age: 42
Discharge: HOME | End: 2022-12-16
Admitting: ORTHOPAEDIC SURGERY

## 2022-12-16 ENCOUNTER — PRE-ADMISSION TESTING (OUTPATIENT)
Dept: PREADMISSION TESTING | Facility: HOSPITAL | Age: 42
End: 2022-12-16

## 2022-12-16 VITALS
WEIGHT: 185 LBS | SYSTOLIC BLOOD PRESSURE: 117 MMHG | DIASTOLIC BLOOD PRESSURE: 80 MMHG | HEART RATE: 70 BPM | RESPIRATION RATE: 16 BRPM | OXYGEN SATURATION: 100 % | BODY MASS INDEX: 25.06 KG/M2 | HEIGHT: 72 IN | TEMPERATURE: 97.9 F

## 2022-12-16 DIAGNOSIS — S92.352A CLOSED DISPLACED FRACTURE OF FIFTH METATARSAL BONE OF LEFT FOOT, INITIAL ENCOUNTER: ICD-10-CM

## 2022-12-16 LAB
ANION GAP SERPL CALCULATED.3IONS-SCNC: 10.4 MMOL/L (ref 5–15)
BUN SERPL-MCNC: 11 MG/DL (ref 6–20)
BUN/CREAT SERPL: 14.5 (ref 7–25)
CALCIUM SPEC-SCNC: 9.5 MG/DL (ref 8.6–10.5)
CHLORIDE SERPL-SCNC: 103 MMOL/L (ref 98–107)
CO2 SERPL-SCNC: 26.6 MMOL/L (ref 22–29)
CREAT SERPL-MCNC: 0.76 MG/DL (ref 0.57–1)
DEPRECATED RDW RBC AUTO: 42.4 FL (ref 37–54)
EGFRCR SERPLBLD CKD-EPI 2021: 100.5 ML/MIN/1.73
ERYTHROCYTE [DISTWIDTH] IN BLOOD BY AUTOMATED COUNT: 12.8 % (ref 12.3–15.4)
GLUCOSE SERPL-MCNC: 93 MG/DL (ref 65–99)
HCG SERPL QL: NEGATIVE
HCT VFR BLD AUTO: 37.3 % (ref 34–46.6)
HGB BLD-MCNC: 12.6 G/DL (ref 12–15.9)
MCH RBC QN AUTO: 31 PG (ref 26.6–33)
MCHC RBC AUTO-ENTMCNC: 33.8 G/DL (ref 31.5–35.7)
MCV RBC AUTO: 91.9 FL (ref 79–97)
PLATELET # BLD AUTO: 190 10*3/MM3 (ref 140–450)
PMV BLD AUTO: 8.3 FL (ref 6–12)
POTASSIUM SERPL-SCNC: 3.8 MMOL/L (ref 3.5–5.2)
RBC # BLD AUTO: 4.06 10*6/MM3 (ref 3.77–5.28)
SODIUM SERPL-SCNC: 140 MMOL/L (ref 136–145)
WBC NRBC COR # BLD: 6.06 10*3/MM3 (ref 3.4–10.8)

## 2022-12-16 PROCEDURE — 36415 COLL VENOUS BLD VENIPUNCTURE: CPT

## 2022-12-16 PROCEDURE — 80048 BASIC METABOLIC PNL TOTAL CA: CPT

## 2022-12-16 PROCEDURE — 73700 CT LOWER EXTREMITY W/O DYE: CPT

## 2022-12-16 PROCEDURE — 84703 CHORIONIC GONADOTROPIN ASSAY: CPT

## 2022-12-16 PROCEDURE — 85027 COMPLETE CBC AUTOMATED: CPT

## 2022-12-16 NOTE — DISCHARGE INSTRUCTIONS
Take the following medications the morning of surgery:  LEVOTHYROXINE      If you are on prescription narcotic pain medication to control your pain you may also take that medication the morning of surgery.    General Instructions:  Do not eat solid food after midnight the night before surgery.  You may drink clear liquids day of surgery but must stop at least one hour before your hospital arrival time.  It is beneficial for you to have a clear drink that contains carbohydrates the day of surgery.  We suggest a 12 to 20 ounce bottle of Gatorade or Powerade for non-diabetic patients or a 12 to 20 ounce bottle of G2 or Powerade Zero for diabetic patients. (Pediatric patients, are not advised to drink a 12 to 20 ounce carbohydrate drink)    Clear liquids are liquids you can see through.  Nothing red in color.     Plain water                               Sports drinks  Sodas                                   Gelatin (Jell-O)  Fruit juices without pulp such as white grape juice and apple juice  Popsicles that contain no fruit or yogurt  Tea or coffee (no cream or milk added)  Gatorade / Powerade  G2 / Powerade Zero      Patients who avoid smoking, chewing tobacco and alcohol for 4 weeks prior to surgery have a reduced risk of post-operative complications.  Quit smoking as many days before surgery as you can.  Do not smoke, use chewing tobacco or drink alcohol the day of surgery.   If applicable bring your C-PAP/ BI-PAP machine.  Bring any papers given to you in the doctor’s office.  Wear clean comfortable clothes.  Do not wear contact lenses, false eyelashes or make-up.  Bring a case for your glasses.   Bring crutches or walker if applicable.  Remove all piercings.  Leave jewelry and any other valuables at home.  Hair extensions with metal clips must be removed prior to surgery.  The Pre-Admission Testing nurse will instruct you to bring medications if unable to obtain an accurate list in Pre-Admission Testing.         Preventing a Surgical Site Infection:  For 2 to 3 days before surgery, avoid shaving with a razor because the razor can irritate skin and make it easier to develop an infection.    Any areas of open skin can increase the risk of a post-operative wound infection by allowing bacteria to enter and travel throughout the body.  Notify your surgeon if you have any skin wounds / rashes even if it is not near the expected surgical site.  The area will need assessed to determine if surgery should be delayed until it is healed.  The night prior to surgery shower using a fresh bar of anti-bacterial soap (such as Dial) and clean washcloth.  Sleep in a clean bed with clean clothing.  Do not allow pets to sleep with you.  Shower on the morning of surgery using a fresh bar of anti-bacterial soap (such as Dial) and clean washcloth.  Dry with a clean towel and dress in clean clothing.  Ask your surgeon if you will be receiving antibiotics prior to surgery.  Make sure you, your family, and all healthcare providers clean their hands with soap and water or an alcohol based hand  before caring for you or your wound.    Day of surgery:  Your arrival time is approximately two hours before your scheduled surgery time.  Upon arrival, a Pre-op nurse and Anesthesiologist will review your health history, obtain vital signs, and answer questions you may have.  The only belongings needed at this time will be a list of your home medications and if applicable your C-PAP/BI-PAP machine.  A Pre-op nurse will start an IV and you may receive medication in preparation for surgery, including something to help you relax.     Please be aware that surgery does come with discomfort.  We want to make every effort to control your discomfort so please discuss any uncontrolled symptoms with your nurse.   Your doctor will most likely have prescribed pain medications.      If you are going home after surgery you will receive individualized written  care instructions before being discharged.  A responsible adult must drive you to and from the hospital on the day of your surgery and stay with you for 24 hours.  Discharge prescriptions can be filled by the hospital pharmacy during regular pharmacy hours.  If you are having surgery late in the day/evening your prescription may be e-prescribed to your pharmacy.  Please verify your pharmacy hours or chose a 24 hour pharmacy to avoid not having access to your prescription because your pharmacy has closed for the day.    If you are staying overnight following surgery, you will be transported to your hospital room following the recovery period.  [facility] has all private rooms.    If you have any questions please call Pre-Admission Testing at [phone].  Deductibles and co-payments are collected on the day of service. Please be prepared to pay the required co-pay, deductible or deposit on the day of service as defined by your plan.    Call your surgeon immediately if you experience any of the following symptoms:  Sore Throat  Shortness of Breath or difficulty breathing  Cough  Chills  Body soreness or muscle pain  Headache  Fever  New loss of taste or smell  Do not arrive for your surgery ill.  Your procedure will need to be rescheduled to another time.  You will need to call your physician before the day of surgery to avoid any unnecessary exposure to hospital staff as well as other patients.

## 2022-12-19 NOTE — H&P
Patient: Linda Marrufo  YOB: 1980 42 y.o. female  Medical Record Number: [id number]     Chief Complaints: I hurt my foot     History of Present Illness: Patient injured her left foot on 11/27/2022 when she fell off of some type of exercise tear.  She was subsequently seen by Dr. Valentin on the following day and placed into a boot and was told that he completely waited for surgical treatment or nonoperative treatment.  She subsequently saw Kay on 10/7/2022 and was instructed to continue with boot and crutches limiting weightbearing and is seen here today for further evaluation.  She has mild complaints of aching pain in the distal lateral aspect of the left foot but no specific complaints of pain at the ankle.       She is quite active as a  and plays volleyball several times per week and is an active runner.        HPI     Allergies: No Known Allergies     Medications:        Current Outpatient Medications on File Prior to Visit   Medication Sig   • levothyroxine (SYNTHROID, LEVOTHROID) 100 MCG tablet TAKE 1 TABLET BY MOUTH EVERY MORNING BEFORE BREAKFAST   • promethazine (PHENERGAN) 25 MG tablet Take 1 tablet by mouth Every 6 (Six) Hours As Needed for Nausea or Vomiting.      No current facility-administered medications on file prior to visit.         Medical History[]Expand by Default        Past Medical History:   Diagnosis Date   • HA (headache)     • History of ovarian cyst     • History of weight gain     • Hodgkin disease (HCC)       RELAPSED   • Hypothyroidism     • Lymphoma (HCC)     • Menopause     • Ovarian failure due to cancer therapy           Surgical History[]Expand by Default         Past Surgical History:   Procedure Laterality Date   • HERNIA REPAIR         PT WAS 5 YO   • OTHER SURGICAL HISTORY Left 2010     Insertion trifusion cath   • OTHER SURGICAL HISTORY         lymph node removed         Social History            Occupational History       Employer:  [email or web address]   Tobacco Use   • Smoking status: Never   • Smokeless tobacco: Never   Vaping Use   • Vaping Use: Never used   Substance and Sexual Activity   • Alcohol use: Yes       Alcohol/week: 1.0 standard drink       Types: 1 Standard drinks or equivalent per week       Comment: OCCASIONAL   • Drug use: No   • Sexual activity: Not Currently       Partners: Male       Birth control/protection: Condom, None      Social History          Social History Narrative   • Not on file            Family History   Problem Relation Age of Onset   • No Known Problems Mother     • Diabetes Father     • Stroke Father     • No Known Problems Sister     • No Known Problems Brother     • No Known Problems Maternal Aunt     • No Known Problems Maternal Uncle     • Other Paternal Aunt           sarcoma   • No Known Problems Paternal Uncle     • No Known Problems Maternal Grandmother     • No Known Problems Maternal Grandfather     • No Known Problems Paternal Grandmother     • No Known Problems Paternal Grandfather     • Cancer Neg Hx           Review of Systems: 14 point review of systems performed, positive pertinent findings identified in HPI. All remaining systems negative      Review of Systems        Physical Exam:   Vitals       Vitals:     12/12/22 1105   Temp: 98.1 °F (36.7 °C)   Weight: 82.6 kg (182 lb)   Height: 182.9 cm (72\")      Performed 12/12/2022  Physical Exam   Constitutional: pleasant, well developed   Eyes: sclera non icteric  Hearing : adequate for exam  Cardiovascular: palpable pulses in left foot, left calf/ thigh NT without sign of DVT  Respiratoy: breathing unlabored   Neurological: grossly sensate to LT throughout left LE  Psychiatric: oriented with normal mood and affect.   Lymphatic: No palpable popliteal lymphadenopathy left LE  Skin: intact throughout left leg/foot  Musculoskeletal: She has mild discomfort to palpation with mild swelling over the distal lateral aspect of the left forefoot.  She was " nontender dorsum of the left midfoot and with without specific focal tenderness at the anterolateral ankle or peroneal tendons.  Physical Exam  Ortho Exam     Radiology: 3 views of the left foot ordered evaluate fracture reviewed and compared to previous x-rays show an oblique fracture of the left distal fifth metatarsal with mild comminution.     Assessment/Plan: 1.  Left displaced oblique comminuted fifth metatarsal fracture     We discussed nonoperative versus operative treatment options.  From a nonoperative standpoint we can see how this does and could still require operative treatment down the road and from an operative standpoint we could proceed with operative fixation restore alignment and get some internal fixation which would allow possible earlier weightbearing and having to wait and see if this healed or not and then for possible forming surgery.     Other guarantees could be given decision made to proceed with operative treatment which I think is very reasonable given her active lifestyle the alignment and gapping of this fracture     She voiced clear understanding the operative procedure with known fixation using plates and screws and possible cadaver bone grafting with associated risk benefits potential outcomes and complications which can include or not limited to heart attack stroke death pneumonia infection bleeding damage to blood vessels nerves or tendons blood clots pulmonary embolism persistent or worsening pain stiffness need for subsequent surgery and failure to return to presurgery and precondition levels of activity as well as small risk of disease transmission from cadaver graft.     All her questions were answered to her full satisfaction she will continue with use of her boot doing only partial weightbearing if needed and instructed on use of a scooter and obtaining this.     Will get a CT scan for preoperative planning and plan to proceed with this on outpatient basis on 12/20/2022.   She was encouraged to call if she has any questions prior to surgery.

## 2022-12-20 ENCOUNTER — HOSPITAL ENCOUNTER (OUTPATIENT)
Facility: HOSPITAL | Age: 42
Setting detail: HOSPITAL OUTPATIENT SURGERY
Discharge: HOME | End: 2022-12-20
Attending: ORTHOPAEDIC SURGERY | Admitting: ORTHOPAEDIC SURGERY

## 2022-12-20 ENCOUNTER — ANESTHESIA (OUTPATIENT)
Dept: PERIOP | Facility: HOSPITAL | Age: 42
End: 2022-12-20

## 2022-12-20 ENCOUNTER — ANESTHESIA EVENT (OUTPATIENT)
Dept: PERIOP | Facility: HOSPITAL | Age: 42
End: 2022-12-20

## 2022-12-20 ENCOUNTER — APPOINTMENT (OUTPATIENT)
Dept: GENERAL RADIOLOGY | Facility: HOSPITAL | Age: 42
End: 2022-12-20

## 2022-12-20 VITALS
HEART RATE: 68 BPM | OXYGEN SATURATION: 100 % | DIASTOLIC BLOOD PRESSURE: 59 MMHG | RESPIRATION RATE: 18 BRPM | SYSTOLIC BLOOD PRESSURE: 116 MMHG | TEMPERATURE: 97.6 F

## 2022-12-20 DIAGNOSIS — S92.352A CLOSED DISPLACED FRACTURE OF FIFTH METATARSAL BONE OF LEFT FOOT, INITIAL ENCOUNTER: Primary | ICD-10-CM

## 2022-12-20 PROCEDURE — 25010000002 FENTANYL CITRATE (PF) 50 MCG/ML SOLUTION: Performed by: STUDENT IN AN ORGANIZED HEALTH CARE EDUCATION/TRAINING PROGRAM

## 2022-12-20 PROCEDURE — 25010000002 DEXAMETHASONE PER 1 MG: Performed by: STUDENT IN AN ORGANIZED HEALTH CARE EDUCATION/TRAINING PROGRAM

## 2022-12-20 PROCEDURE — C1713 ANCHOR/SCREW BN/BN,TIS/BN: HCPCS | Performed by: ORTHOPAEDIC SURGERY

## 2022-12-20 PROCEDURE — 76942 ECHO GUIDE FOR BIOPSY: CPT | Performed by: ORTHOPAEDIC SURGERY

## 2022-12-20 PROCEDURE — 73630 X-RAY EXAM OF FOOT: CPT

## 2022-12-20 PROCEDURE — 25010000002 PROPOFOL 10 MG/ML EMULSION: Performed by: NURSE ANESTHETIST, CERTIFIED REGISTERED

## 2022-12-20 PROCEDURE — 28485 OPTX METATARSAL FX EACH: CPT | Performed by: ORTHOPAEDIC SURGERY

## 2022-12-20 PROCEDURE — 25010000002 ROPIVACAINE PER 1 MG: Performed by: STUDENT IN AN ORGANIZED HEALTH CARE EDUCATION/TRAINING PROGRAM

## 2022-12-20 PROCEDURE — 76000 FLUOROSCOPY <1 HR PHYS/QHP: CPT

## 2022-12-20 PROCEDURE — 25010000002 CEFAZOLIN IN DEXTROSE 2-4 GM/100ML-% SOLUTION: Performed by: ORTHOPAEDIC SURGERY

## 2022-12-20 PROCEDURE — 25010000002 ONDANSETRON PER 1 MG: Performed by: NURSE ANESTHETIST, CERTIFIED REGISTERED

## 2022-12-20 PROCEDURE — 25010000002 MIDAZOLAM PER 1 MG: Performed by: STUDENT IN AN ORGANIZED HEALTH CARE EDUCATION/TRAINING PROGRAM

## 2022-12-20 DEVICE — BONE BRM TENSIX 2.5CC: Type: IMPLANTABLE DEVICE | Site: TOES | Status: FUNCTIONAL

## 2022-12-20 DEVICE — IMPLANTABLE DEVICE
Type: IMPLANTABLE DEVICE | Site: TOES | Status: FUNCTIONAL
Brand: ORTHOLOC

## 2022-12-20 DEVICE — IMPLANTABLE DEVICE
Type: IMPLANTABLE DEVICE | Site: TOES | Status: FUNCTIONAL
Brand: ORTHOLOC 3DI

## 2022-12-20 RX ORDER — ONDANSETRON 2 MG/ML
4 INJECTION INTRAMUSCULAR; INTRAVENOUS ONCE AS NEEDED
Status: DISCONTINUED | OUTPATIENT
Start: 2022-12-20 | End: 2022-12-20 | Stop reason: HOSPADM

## 2022-12-20 RX ORDER — PROPOFOL 10 MG/ML
VIAL (ML) INTRAVENOUS AS NEEDED
Status: DISCONTINUED | OUTPATIENT
Start: 2022-12-20 | End: 2022-12-20 | Stop reason: SURG

## 2022-12-20 RX ORDER — ASPIRIN 325 MG
325 TABLET, DELAYED RELEASE (ENTERIC COATED) ORAL DAILY
Qty: 30 TABLET | Refills: 1 | Status: SHIPPED | OUTPATIENT
Start: 2022-12-21 | End: 2023-02-09

## 2022-12-20 RX ORDER — HYDROMORPHONE HYDROCHLORIDE 1 MG/ML
0.5 INJECTION, SOLUTION INTRAMUSCULAR; INTRAVENOUS; SUBCUTANEOUS
Status: DISCONTINUED | OUTPATIENT
Start: 2022-12-20 | End: 2022-12-20 | Stop reason: HOSPADM

## 2022-12-20 RX ORDER — NALOXONE HCL 0.4 MG/ML
0.2 VIAL (ML) INJECTION AS NEEDED
Status: DISCONTINUED | OUTPATIENT
Start: 2022-12-20 | End: 2022-12-20 | Stop reason: HOSPADM

## 2022-12-20 RX ORDER — LIDOCAINE HYDROCHLORIDE 20 MG/ML
INJECTION, SOLUTION INFILTRATION; PERINEURAL AS NEEDED
Status: DISCONTINUED | OUTPATIENT
Start: 2022-12-20 | End: 2022-12-20 | Stop reason: SURG

## 2022-12-20 RX ORDER — ACETAMINOPHEN 500 MG
1000 TABLET ORAL ONCE
Status: COMPLETED | OUTPATIENT
Start: 2022-12-20 | End: 2022-12-20

## 2022-12-20 RX ORDER — DIPHENHYDRAMINE HYDROCHLORIDE 50 MG/ML
12.5 INJECTION INTRAMUSCULAR; INTRAVENOUS
Status: DISCONTINUED | OUTPATIENT
Start: 2022-12-20 | End: 2022-12-20 | Stop reason: HOSPADM

## 2022-12-20 RX ORDER — PROMETHAZINE HYDROCHLORIDE 25 MG/1
25 TABLET ORAL ONCE AS NEEDED
Status: DISCONTINUED | OUTPATIENT
Start: 2022-12-20 | End: 2022-12-20 | Stop reason: HOSPADM

## 2022-12-20 RX ORDER — LABETALOL HYDROCHLORIDE 5 MG/ML
5 INJECTION, SOLUTION INTRAVENOUS
Status: DISCONTINUED | OUTPATIENT
Start: 2022-12-20 | End: 2022-12-20 | Stop reason: HOSPADM

## 2022-12-20 RX ORDER — FENTANYL CITRATE 50 UG/ML
50 INJECTION, SOLUTION INTRAMUSCULAR; INTRAVENOUS
Status: DISCONTINUED | OUTPATIENT
Start: 2022-12-20 | End: 2022-12-20 | Stop reason: HOSPADM

## 2022-12-20 RX ORDER — ONDANSETRON 2 MG/ML
INJECTION INTRAMUSCULAR; INTRAVENOUS AS NEEDED
Status: DISCONTINUED | OUTPATIENT
Start: 2022-12-20 | End: 2022-12-20 | Stop reason: SURG

## 2022-12-20 RX ORDER — SODIUM CHLORIDE 0.9 % (FLUSH) 0.9 %
3 SYRINGE (ML) INJECTION EVERY 12 HOURS SCHEDULED
Status: DISCONTINUED | OUTPATIENT
Start: 2022-12-20 | End: 2022-12-20 | Stop reason: HOSPADM

## 2022-12-20 RX ORDER — DIPHENHYDRAMINE HCL 25 MG
25 CAPSULE ORAL
Status: DISCONTINUED | OUTPATIENT
Start: 2022-12-20 | End: 2022-12-20 | Stop reason: HOSPADM

## 2022-12-20 RX ORDER — OXYCODONE HYDROCHLORIDE AND ACETAMINOPHEN 5; 325 MG/1; MG/1
1 TABLET ORAL ONCE AS NEEDED
Status: DISCONTINUED | OUTPATIENT
Start: 2022-12-20 | End: 2022-12-20 | Stop reason: HOSPADM

## 2022-12-20 RX ORDER — PROMETHAZINE HYDROCHLORIDE 25 MG/1
25 SUPPOSITORY RECTAL ONCE AS NEEDED
Status: DISCONTINUED | OUTPATIENT
Start: 2022-12-20 | End: 2022-12-20 | Stop reason: HOSPADM

## 2022-12-20 RX ORDER — DEXAMETHASONE SODIUM PHOSPHATE 4 MG/ML
INJECTION, SOLUTION INTRA-ARTICULAR; INTRALESIONAL; INTRAMUSCULAR; INTRAVENOUS; SOFT TISSUE
Status: COMPLETED | OUTPATIENT
Start: 2022-12-20 | End: 2022-12-20

## 2022-12-20 RX ORDER — OXYCODONE AND ACETAMINOPHEN 7.5; 325 MG/1; MG/1
1 TABLET ORAL EVERY 4 HOURS PRN
Status: DISCONTINUED | OUTPATIENT
Start: 2022-12-20 | End: 2022-12-20 | Stop reason: HOSPADM

## 2022-12-20 RX ORDER — MIDAZOLAM HYDROCHLORIDE 1 MG/ML
1 INJECTION INTRAMUSCULAR; INTRAVENOUS
Status: COMPLETED | OUTPATIENT
Start: 2022-12-20 | End: 2022-12-20

## 2022-12-20 RX ORDER — EPHEDRINE SULFATE 50 MG/ML
5 INJECTION, SOLUTION INTRAVENOUS ONCE AS NEEDED
Status: DISCONTINUED | OUTPATIENT
Start: 2022-12-20 | End: 2022-12-20 | Stop reason: HOSPADM

## 2022-12-20 RX ORDER — DEXAMETHASONE SODIUM PHOSPHATE 4 MG/ML
INJECTION, SOLUTION INTRA-ARTICULAR; INTRALESIONAL; INTRAMUSCULAR; INTRAVENOUS; SOFT TISSUE AS NEEDED
Status: DISCONTINUED | OUTPATIENT
Start: 2022-12-20 | End: 2022-12-20 | Stop reason: SURG

## 2022-12-20 RX ORDER — ONDANSETRON 4 MG/1
4 TABLET, FILM COATED ORAL EVERY 8 HOURS PRN
Qty: 40 TABLET | Refills: 0 | Status: SHIPPED | OUTPATIENT
Start: 2022-12-20 | End: 2023-01-04

## 2022-12-20 RX ORDER — CEPHALEXIN 500 MG/1
500 CAPSULE ORAL EVERY 6 HOURS
Qty: 8 CAPSULE | Refills: 0 | Status: SHIPPED | OUTPATIENT
Start: 2022-12-20 | End: 2022-12-22

## 2022-12-20 RX ORDER — HYDROCODONE BITARTRATE AND ACETAMINOPHEN 7.5; 325 MG/1; MG/1
1 TABLET ORAL ONCE AS NEEDED
Status: DISCONTINUED | OUTPATIENT
Start: 2022-12-20 | End: 2022-12-20 | Stop reason: HOSPADM

## 2022-12-20 RX ORDER — MIDAZOLAM HYDROCHLORIDE 1 MG/ML
1 INJECTION INTRAMUSCULAR; INTRAVENOUS ONCE
Status: COMPLETED | OUTPATIENT
Start: 2022-12-20 | End: 2022-12-20

## 2022-12-20 RX ORDER — SODIUM CHLORIDE 0.9 % (FLUSH) 0.9 %
3-10 SYRINGE (ML) INJECTION AS NEEDED
Status: DISCONTINUED | OUTPATIENT
Start: 2022-12-20 | End: 2022-12-20 | Stop reason: HOSPADM

## 2022-12-20 RX ORDER — LIDOCAINE HYDROCHLORIDE 10 MG/ML
0.5 INJECTION, SOLUTION EPIDURAL; INFILTRATION; INTRACAUDAL; PERINEURAL ONCE AS NEEDED
Status: DISCONTINUED | OUTPATIENT
Start: 2022-12-20 | End: 2022-12-20 | Stop reason: HOSPADM

## 2022-12-20 RX ORDER — CEFAZOLIN SODIUM 2 G/100ML
2 INJECTION, SOLUTION INTRAVENOUS ONCE
Status: COMPLETED | OUTPATIENT
Start: 2022-12-20 | End: 2022-12-20

## 2022-12-20 RX ORDER — OXYCODONE HYDROCHLORIDE AND ACETAMINOPHEN 5; 325 MG/1; MG/1
1-2 TABLET ORAL EVERY 4 HOURS PRN
Qty: 40 TABLET | Refills: 0 | Status: SHIPPED | OUTPATIENT
Start: 2022-12-20 | End: 2023-01-19

## 2022-12-20 RX ORDER — ROPIVACAINE HYDROCHLORIDE 5 MG/ML
INJECTION, SOLUTION EPIDURAL; INFILTRATION; PERINEURAL
Status: COMPLETED | OUTPATIENT
Start: 2022-12-20 | End: 2022-12-20

## 2022-12-20 RX ORDER — HYDRALAZINE HYDROCHLORIDE 20 MG/ML
5 INJECTION INTRAMUSCULAR; INTRAVENOUS
Status: DISCONTINUED | OUTPATIENT
Start: 2022-12-20 | End: 2022-12-20 | Stop reason: HOSPADM

## 2022-12-20 RX ORDER — FLUMAZENIL 0.1 MG/ML
0.2 INJECTION INTRAVENOUS AS NEEDED
Status: DISCONTINUED | OUTPATIENT
Start: 2022-12-20 | End: 2022-12-20 | Stop reason: HOSPADM

## 2022-12-20 RX ORDER — SODIUM CHLORIDE, SODIUM LACTATE, POTASSIUM CHLORIDE, CALCIUM CHLORIDE 600; 310; 30; 20 MG/100ML; MG/100ML; MG/100ML; MG/100ML
9 INJECTION, SOLUTION INTRAVENOUS CONTINUOUS
Status: DISCONTINUED | OUTPATIENT
Start: 2022-12-20 | End: 2022-12-20 | Stop reason: HOSPADM

## 2022-12-20 RX ADMIN — PROPOFOL 250 MG: 10 INJECTION, EMULSION INTRAVENOUS at 12:19

## 2022-12-20 RX ADMIN — ACETAMINOPHEN 1000 MG: 500 TABLET, FILM COATED ORAL at 09:45

## 2022-12-20 RX ADMIN — MIDAZOLAM 1 MG: 1 INJECTION INTRAMUSCULAR; INTRAVENOUS at 09:54

## 2022-12-20 RX ADMIN — MIDAZOLAM 1 MG: 1 INJECTION INTRAMUSCULAR; INTRAVENOUS at 09:14

## 2022-12-20 RX ADMIN — CEFAZOLIN SODIUM 2 G: 2 INJECTION, SOLUTION INTRAVENOUS at 12:07

## 2022-12-20 RX ADMIN — MIDAZOLAM 1 MG: 1 INJECTION INTRAMUSCULAR; INTRAVENOUS at 12:00

## 2022-12-20 RX ADMIN — ONDANSETRON 4 MG: 2 INJECTION INTRAMUSCULAR; INTRAVENOUS at 14:07

## 2022-12-20 RX ADMIN — DEXAMETHASONE SODIUM PHOSPHATE 4 MG: 4 INJECTION, SOLUTION INTRAMUSCULAR; INTRAVENOUS at 09:54

## 2022-12-20 RX ADMIN — DEXAMETHASONE SODIUM PHOSPHATE 8 MG: 4 INJECTION, SOLUTION INTRA-ARTICULAR; INTRALESIONAL; INTRAMUSCULAR; INTRAVENOUS; SOFT TISSUE at 12:22

## 2022-12-20 RX ADMIN — PROPOFOL 50 MG: 10 INJECTION, EMULSION INTRAVENOUS at 13:49

## 2022-12-20 RX ADMIN — SODIUM CHLORIDE, POTASSIUM CHLORIDE, SODIUM LACTATE AND CALCIUM CHLORIDE 9 ML/HR: 600; 310; 30; 20 INJECTION, SOLUTION INTRAVENOUS at 08:43

## 2022-12-20 RX ADMIN — ROPIVACAINE HYDROCHLORIDE 30 ML: 5 INJECTION EPIDURAL; INFILTRATION; PERINEURAL at 09:54

## 2022-12-20 RX ADMIN — LIDOCAINE HYDROCHLORIDE 80 MG: 20 INJECTION, SOLUTION INFILTRATION; PERINEURAL at 12:19

## 2022-12-20 RX ADMIN — FENTANYL CITRATE 50 MCG: 50 INJECTION, SOLUTION INTRAMUSCULAR; INTRAVENOUS at 09:54

## 2022-12-20 NOTE — ANESTHESIA POSTPROCEDURE EVALUATION
Patient: Linda Marrufo    Procedure Summary     Date: 12/20/22 Room / Location: [facility] OR [address] [place] [address] [facility]    Anesthesia Start: 1212 Anesthesia Stop: 1436    Procedure: Left fifth METATARSAL OPEN REDUCTION INTERNAL FIXATION with BONE graft (Left: Toes) Diagnosis:       Closed displaced fracture of fifth metatarsal bone of left foot, initial encounter      (Closed displaced fracture of fifth metatarsal bone of left foot, initial encounter [S92.352A])    Surgeons: Jass Ortiz MD Provider: Hiren Jackson MD    Anesthesia Type: general ASA Status: 2          Anesthesia Type: general    Vitals  Vitals Value Taken Time   /67 12/20/22 1530   Temp 36.4 °C (97.6 °F) 12/20/22 1515   Pulse 65 12/20/22 1542   Resp 16 12/20/22 1530   SpO2 100 % 12/20/22 1542   Vitals shown include unvalidated device data.        Post Anesthesia Care and Evaluation    Patient location during evaluation: bedside  Patient participation: complete - patient participated  Level of consciousness: awake and alert  Pain score: 0  Pain management: adequate    Airway patency: patent  Anesthetic complications: No anesthetic complications  PONV Status: controlled  Cardiovascular status: acceptable and hemodynamically stable  Respiratory status: acceptable  Hydration status: acceptable    Comments: /59 (BP Location: Left arm, Patient Position: Sitting)   Pulse 68   Temp 36.4 °C (97.6 °F) (Oral)   Resp 18   SpO2 100%     POPC supplied by PNB with continued effect in expected distribution

## 2022-12-20 NOTE — OP NOTE
Operative Note      Facility: [facility]  Patient Name: Linda Marrufo  YOB: 1980  Date: 12/20/2022  Medical Record Number: [id number]      Pre-op Diagnosis: 1.  Left displaced oblique distal 5th metatarsal fracture with comminution    Post-op Diagnosis:  1.  Left displaced oblique distal 5th metatarsal fracture with comminution      Procedure(s):  1.  Open reduction internal fixation left fifth metatarsal fracture with DBM bone grafting    Surgeon(s):  Jass Ortiz MD    Anesthesia: General with Block  Anesthesiologist: Hiren Jackson MD  CRNA: Elvis Harry CRNA; Kassidy Lowe CRNA    Staff:   Circulator: Melany Eason RN  Radiology Technologist: Makayla Armendariz; Margret Lr  Scrub Person: Melanie Garza; Aamir Rivera  Vendor Representative: Rich Okeefe  Assistants : none      Tourniquet time: 74 minutes        Estimated Blood Loss: Minimal  Drains: None  Specimens: * No orders in the log *  Findings: See Dictation    Complications: None      Indications for Procedure: Patient sustained fracture to her left foot around 11/27/2022 when she fell from some type of exercise chair.  She was initially seen by podiatry and subsequently saw me in the office and decision was made to proceed with operative treatment.  She voiced a clear understanding of risk benefits potential outcomes and complications.          Description of Procedure: Preoperative informed consent and anesthesia evaluation were obtained.  IV antibiotics were administered and the surgical site was marked.  Block was administered by anesthesia.  Patient was brought to the operating room placed in supine position.  Anesthesia was induced and LMA was positioned.  Well-padded tourniquet was placed left proximal thigh.  Left foot and leg were prepped and draped in a sterile fashion and surgical timeout was performed.    Fracture at the left fifth metatarsal identified radiographically.  The left  foot and leg were exsanguinated and pneumatic tourniquet inflated to 250 mmHg.  Incision was made somewhat dorsal laterally over the fifth MTP joint and carried proximally.  Full-thickness flaps were carefully elevated.  The extensor tendon was identified and a portion of the capsule lateral to this was incised exposing the joint.  Full-thickness flaps were carefully elevated.  There was quite a bit of thickened periosteum over the fracture site that had to be elevated but minimized this is much as possible to expose the fracture.  The comminuted fragment was removed from within the fracture site.  There was quite a bit of thickened fibrotic tissue within this area that had to be removed with a rongeur in order to expose the fracture and mobilize this.  Once this was done the fracture was then distracted and interdigitated and clinically was well reduced.  X-ray showed acceptable reduction although not completely anatomic but clinically this was as reduced as possible and felt to be well aligned and acceptable.    Given the nature of this fracture I felt it was best to bone graft this.  The fracture was then again opened and irrigated the fracture sites were sprayed with PRP and packed with DBM bone grafting.  The fracture was then reduced and crossclamped and appeared to be well aligned.  This was then secured with a 2.0 mm interfragmentary screw with excellent purchase.    I then contoured a [employer] small bones dorsal plate placing this over the dorsum of the fifth metatarsal head and neck and along the shaft somewhat dorsal laterally.  Clinical positioning was excellent such that the screws would be appropriately contained and give good fixation to the fracture site.  This was secured provisionally.  I then placed 1 locking screw and 1 nonlocking screw distally and 2 locking screws and one nonlocking screw proximally.  Although x-ray showed some questionable alignment to the plate clinically it was well  aligned and fracture was rigidly fixed and hardware was well contained and I felt this was acceptable.    The tourniquet was then released and hemostasis was obtained.  The capsule and deep tissue was closed over the plate with 3-0 Vicryl suture.  Skin was then closed with 4-0 Vicryl and 4-0 nylon modified horizontal mattress sutures.  Sterile dressing was applied.  She was then placed into a very well-padded forefoot and ankle toe spica dressing as well as a well-padded short leg posterior splint in neutral dorsiflexion.  She was awakened transferred to stretcher and taken recovery in stable condition.

## 2022-12-20 NOTE — ANESTHESIA PROCEDURE NOTES
Peripheral Block    Pre-sedation assessment completed: 12/20/2022 9:53 AM    Patient reassessed immediately prior to procedure    Patient location during procedure: pre-op  Start time: 12/20/2022 9:54 AM  Stop time: 12/20/2022 9:59 AM  Reason for block: at surgeon's request and post-op pain management  Performed by  Anesthesiologist: Hiren Jackson MD  Preanesthetic Checklist  Completed: patient identified, IV checked, site marked, risks and benefits discussed, surgical consent, monitors and equipment checked, pre-op evaluation and timeout performed  Prep:  Pt Position: supine  Sterile barriers:mask, gloves and cap  Prep: ChloraPrep  Patient monitoring: blood pressure monitoring, continuous pulse oximetry and EKG  Procedure    Sedation: yes  Performed under: local infiltration  Guidance:ultrasound guided    ULTRASOUND INTERPRETATION.  Using ultrasound guidance a 21 G gauge needle was placed in close proximity to the nerve, at which point, under ultrasound guidance anesthetic was injected in the area of the nerve and spread of the anesthesia was seen on ultrasound in close proximity thereto.  There were no abnormalities seen on ultrasound; a digital image was taken; and the patient tolerated the procedure with no complications. Images:still images obtained, printed/placed on chart    Laterality:left  Block Type:popliteal  Injection Technique:single-shot  Needle Type:echogenic and Tuohy  Needle Gauge:21 G  Resistance on Injection: none    Medications Used: ropivacaine (NAROPIN) 0.5 % injection - Injection   30 mL - 12/20/2022 9:54:00 AM  dexamethasone (DECADRON) injection - Injection   4 mg - 12/20/2022 9:54:00 AM      Post Assessment  Injection Assessment: negative aspiration for heme, no paresthesia on injection and incremental injection  Patient Tolerance:comfortable throughout block  Complications:no  Additional Notes  Ultrasound guidance used to visualize nerve anatomy, guide needle placement and verify  local anesthetic disbursement.

## 2022-12-20 NOTE — ANESTHESIA PREPROCEDURE EVALUATION
Anesthesia Evaluation     Patient summary reviewed and Nursing notes reviewed   no history of anesthetic complications:  NPO Solid Status: > 8 hours  NPO Liquid Status: > 2 hours           Airway   Mallampati: II  TM distance: >3 FB  Neck ROM: full  Dental      Pulmonary    Cardiovascular         Neuro/Psych  GI/Hepatic/Renal/Endo    (+)   thyroid problem hypothyroidism    Musculoskeletal     Abdominal    Substance History      OB/GYN          Other          Other Comment: Hx of lymphoma                Anesthesia Plan    ASA 2     general     intravenous induction     Anesthetic plan, risks, benefits, and alternatives have been provided, discussed and informed consent has been obtained with: patient.        CODE STATUS:

## 2022-12-20 NOTE — BRIEF OP NOTE
TOE OPEN REDUCTION INTERNAL FIXATION  Progress Note    Linda Marrufo  12/20/2022    Pre-op Diagnosis:   Closed displaced fracture of fifth metatarsal bone of left foot, initial encounter [S92.352A]       Post-Op Diagnosis Codes:     * Closed displaced fracture of fifth metatarsal bone of left foot, initial encounter [S92.352A]    Procedure/CPT® Codes:        Procedure(s):  Left fifth METATARSAL OPEN REDUCTION INTERNAL FIXATION with BONE graft        Surgeon(s):  Jass Ortiz MD    Anesthesia: General with Block    Staff:   Circulator: Melany Eason RN  Radiology Technologist: Marco Armendariz Oliva  Scrub Person: Melanie Garza; Aamir Rivera  Vendor Representative: Rich Okeefe         Estimated Blood Loss: minimal    Urine Voided: * No values recorded between 12/20/2022 12:12 PM and 12/20/2022  2:26 PM *    Specimens:                None    TT 74 min      Drains: * No LDAs found *    Findings: see dict        Complications: none          Jass Ortiz MD     Date: 12/20/2022  Time: 14:29 EST

## 2022-12-20 NOTE — ANESTHESIA PROCEDURE NOTES
Airway  Urgency: elective    Date/Time: 12/20/2022 12:21 PM  Airway not difficult    General Information and Staff    Patient location during procedure: OR  Anesthesiologist: Hiren Jackson MD  CRNA/CAA: Kassidy Lowe CRNA    Indications and Patient Condition  Indications for airway management: airway protection    Preoxygenated: yes  MILS not maintained throughout  Mask difficulty assessment: 0 - not attempted    Final Airway Details  Final airway type: supraglottic airway      Successful airway: unique  Size 4     Number of attempts at approach: 1  Assessment: lips, teeth, and gum same as pre-op and atraumatic intubation

## 2022-12-22 NOTE — ANESTHESIA POSTPROCEDURE EVALUATION
Patient: Linda Marrufo    Procedure Summary     Date: 12/20/22 Room / Location: [facility] OR [address] [place] [address] [facility]    Anesthesia Start: 1212 Anesthesia Stop: 1436    Procedure: Left fifth METATARSAL OPEN REDUCTION INTERNAL FIXATION with BONE graft (Left: Toes) Diagnosis:       Closed displaced fracture of fifth metatarsal bone of left foot, initial encounter      (Closed displaced fracture of fifth metatarsal bone of left foot, initial encounter [S92.352A])    Surgeons: Jass Ortiz MD Provider: Hiren Jackson MD    Anesthesia Type: general ASA Status: 2          Anesthesia Type: general    Vitals  Vitals Value Taken Time   /67 12/20/22 1530   Temp 36.4 °C (97.6 °F) 12/20/22 1515   Pulse 65 12/20/22 1542   Resp 16 12/20/22 1530   SpO2 100 % 12/20/22 1542   Vitals shown include unvalidated device data.        Post Anesthesia Care and Evaluation    Patient location during evaluation: bedside  Patient participation: complete - patient participated  Level of consciousness: awake and alert  Pain management: adequate    Airway patency: patent  Anesthetic complications: No anesthetic complications    Cardiovascular status: acceptable  Respiratory status: acceptable  Hydration status: acceptable    Comments: /59 (BP Location: Left arm, Patient Position: Sitting)   Pulse 68   Temp 36.4 °C (97.6 °F) (Oral)   Resp 18   SpO2 100%

## 2023-01-04 ENCOUNTER — OFFICE VISIT (OUTPATIENT)
Dept: ORTHOPEDIC SURGERY | Facility: CLINIC | Age: 43
End: 2023-01-04
Payer: COMMERCIAL

## 2023-01-04 VITALS — WEIGHT: 185 LBS | BODY MASS INDEX: 25.06 KG/M2 | TEMPERATURE: 96.8 F | HEIGHT: 72 IN

## 2023-01-04 DIAGNOSIS — S92.352D CLOSED DISPLACED FRACTURE OF FIFTH METATARSAL BONE OF LEFT FOOT WITH ROUTINE HEALING, SUBSEQUENT ENCOUNTER: Primary | ICD-10-CM

## 2023-01-04 PROCEDURE — 73630 X-RAY EXAM OF FOOT: CPT | Performed by: ORTHOPAEDIC SURGERY

## 2023-01-04 PROCEDURE — 99024 POSTOP FOLLOW-UP VISIT: CPT | Performed by: ORTHOPAEDIC SURGERY

## 2023-01-04 PROCEDURE — 29540 STRAPPING ANKLE &/FOOT: CPT | Performed by: ORTHOPAEDIC SURGERY

## 2023-01-04 NOTE — PROGRESS NOTES
Foot Follow Up      Patient: Linda Marrufo    YOB: 1980 42 y.o. female    Chief Complaints: Foot \"feels fine\"    History of Present Illness: Patient follows up from ORIF of comminuted oblique distal fifth metatarsal fracture with plating and DBM bone grafting on 12/20/2022.    She reports that she is doing well and really not having any appreciable pain but does still feel she has some numbness in her leg and foot residual from her block.  She does feel like the numbness is improving.  Current pain is rated 0 out of 10  HPI    ROS: No foot pain  Past Medical History:   Diagnosis Date   • Fracture, foot 11.27    surgery 12.20.22   • HA (headache)    • History of weight gain    • Hodgkin disease (HCC)     RELAPSED   • Hypothyroidism    • Lymphoma (HCC)    • Ovarian failure due to cancer therapy    • Peripheral neuropathy due to chemotherapy (HCC)      Physical Exam:   Vitals:    01/04/23 1024   Temp: 96.8 °F (36 °C)   Weight: 83.9 kg (185 lb)   Height: 185.4 cm (73\")   PainSc: 0-No pain     Well developed with normal mood.  Left foot incision is healing nicely without sign of infection there is mild swelling status and diminished sensation around the incision as well as in the lateral foot and leg but was grossly sensate light touch.  Left calf was nontender without sign of DVT      Radiology: 3 views left foot ordered evaluate postoperative alignment reviewed and compared to previous x-rays these show improved alignment of the previous fifth metatarsal fracture.  It is well aligned and hardware appears to be in good alignment and well contained.      Assessment/Plan: Left fifth oblique distal metatarsal fracture status post ORIF    Reviewed with her that the numbness should continue to improve over time and nothing different to do for it.  There is no sign of RSD or infection at this point.    Sutures removed and Steri-Strips were applied.  Sterile dressing was placed and she was placed into a  forefoot and ankle bunion spica dressing.  She will continue elevation nonweightbearing and we will see her back in 2 weeks with x-rays of her left foot.

## 2023-01-19 ENCOUNTER — OFFICE VISIT (OUTPATIENT)
Dept: ORTHOPEDIC SURGERY | Facility: CLINIC | Age: 43
End: 2023-01-19
Payer: COMMERCIAL

## 2023-01-19 VITALS — TEMPERATURE: 96.4 F | HEIGHT: 72 IN | WEIGHT: 185 LBS | BODY MASS INDEX: 25.06 KG/M2

## 2023-01-19 DIAGNOSIS — S92.352D CLOSED DISPLACED FRACTURE OF FIFTH METATARSAL BONE OF LEFT FOOT WITH ROUTINE HEALING, SUBSEQUENT ENCOUNTER: ICD-10-CM

## 2023-01-19 DIAGNOSIS — R52 PAIN: Primary | ICD-10-CM

## 2023-01-19 PROCEDURE — 99024 POSTOP FOLLOW-UP VISIT: CPT | Performed by: ORTHOPAEDIC SURGERY

## 2023-01-19 PROCEDURE — 73630 X-RAY EXAM OF FOOT: CPT | Performed by: ORTHOPAEDIC SURGERY

## 2023-01-19 NOTE — PROGRESS NOTES
"Foot Follow Up      Patient: Linda Marrufo    YOB: 1980 42 y.o. female    Chief Complaints: Foot still \"feels fine\"    History of Present Illness:Patient follows up from ORIF of comminuted oblique distal fifth metatarsal fracture with plating and DBM bone grafting on 12/20/2022.     She seen on 1/4/2023 reporting that she was doing well and really not having any appreciable pain but did still feel she has some numbness in her leg and foot residual from her block.  She d did feel like the numbness was improving.  Current pain was rated 0 out of 10.    We discussed treatment options and there was no sign of RSD nor infection and was having numbness outside the areas of surgery so I felt it was residual from her block.    Sutures removed and Steri-Strips were applied and she was placed back into a forefoot and ankle dressing with instructions to continue with elevation and nonweightbearing    She is seen back today saying that she feels fine.  Still has numbness now getting little bit of tingling in her lateral leg and foot but does feel like it is improving.  She not having any neuropathic pain.  HPI    ROS: No foot pain  Past Medical History:   Diagnosis Date   • Fracture, foot 11.27    surgery 12.20.22   • HA (headache)    • History of weight gain    • Hodgkin disease (HCC)     RELAPSED   • Hypothyroidism    • Lymphoma (HCC)    • Ovarian failure due to cancer therapy    • Peripheral neuropathy due to chemotherapy (HCC)      Physical Exam:   Vitals:    01/19/23 0954   Temp: 96.4 °F (35.8 °C)   Weight: 83.9 kg (185 lb)   Height: 185.4 cm (73\")   PainSc: 0-No pain     Well developed with normal mood.  On exam her left foot incision is healing very nicely there was really no focal tenderness to palpation and no sign of infection.  She did have some diminished sensation in the lateral leg and foot but no hyperesthesia.      Radiology: 3 views of the left foot ordered evaluate postoperative alignment " reviewed and compared to previous x-rays.  There remains good alignment of the fifth fracture which appears to be healing and hardware is intact.      Assessment/Plan:  Left fifth oblique distal metatarsal fracture status post ORIF    Overall she seems be doing well from a fracture standpoint and I do not see any sign of infection or RSD.    Will leave her out of her wrapping but she will keep a clean dressing and Ace wraps over this and continue nonweightbearing for another 10 days and then progress with partial foot flat weightbearing in a postoperative shoe and crutches.    Counseled her that I am encouraged that her neuritic symptoms of numbness are improving and the tingling may be some return of nerve sensation but do not see any sign of RSD or anything different to do for it at this time.    If anything worsens to let me know otherwise I will see her back in 3 weeks with x-ray of her left foot

## 2023-02-06 RX ORDER — LEVOTHYROXINE SODIUM 0.1 MG/1
TABLET ORAL
Qty: 90 TABLET | Refills: 2 | Status: SHIPPED | OUTPATIENT
Start: 2023-02-06

## 2023-02-09 ENCOUNTER — OFFICE VISIT (OUTPATIENT)
Dept: ORTHOPEDIC SURGERY | Facility: CLINIC | Age: 43
End: 2023-02-09
Payer: COMMERCIAL

## 2023-02-09 VITALS — HEIGHT: 72 IN | BODY MASS INDEX: 25.06 KG/M2 | TEMPERATURE: 98.6 F | WEIGHT: 185 LBS

## 2023-02-09 DIAGNOSIS — S92.352D CLOSED DISPLACED FRACTURE OF FIFTH METATARSAL BONE OF LEFT FOOT WITH ROUTINE HEALING, SUBSEQUENT ENCOUNTER: Primary | ICD-10-CM

## 2023-02-09 PROCEDURE — 99024 POSTOP FOLLOW-UP VISIT: CPT | Performed by: ORTHOPAEDIC SURGERY

## 2023-02-09 PROCEDURE — 73630 X-RAY EXAM OF FOOT: CPT | Performed by: ORTHOPAEDIC SURGERY

## 2023-02-09 NOTE — PROGRESS NOTES
"Foot Follow Up      Patient: Linda Marrufo    YOB: 1980 42 y.o. female    Chief Complaints: Foot \"feels good\"    History of Present Illness:Patient follows up from ORIF of comminuted oblique distal fifth metatarsal fracture with plating and DBM bone grafting on 12/20/2022.     She seen on 1/4/2023 reporting that she was doing well and really not having any appreciable pain but did still feel she has some numbness in her leg and foot residual from her block.  She d did feel like the numbness was improving.  Current pain was rated 0 out of 10.     We discussed treatment options and there was no sign of RSD nor infection and was having numbness outside the areas of surgery so I felt it was residual from her block.     Sutures removed and Steri-Strips were applied and she was placed back into a forefoot and ankle dressing with instructions to continue with elevation and nonweightbearing     She was seen on 1/19/2023 stating that she felt fine.    She still had numbness and was then getting little bit of tingling in her lateral leg and foot but did feel like it is improving.  She was not having any neuropathic pain.    At that time her seem to be healing well and she denied any sign of infection or RSD.  She was left out of her wrapping and instructed on wound care and to continue nonweightbearing for another 10 days and then progress to partial foot flat weightbearing postoperative shoe and crutches.  I was encouraged that her neuritic symptoms of numbness were improving and that the tingling may be some return of nerve sensation but did not see anything as far as RSD or different to do at that time.    She is seen back today stating that her foot feels good.  The sensation is returning to her leg with still a bit diminished sensation laterally but no further tingling.  Pain is rated 0 out of 10    ROS: No foot pain  Past Medical History:   Diagnosis Date   • Fracture, foot 11.27    surgery 12.20.22 " "  • HA (headache)    • History of weight gain    • Hodgkin disease (HCC)     RELAPSED   • Hypothyroidism    • Lymphoma (HCC)    • Ovarian failure due to cancer therapy    • Peripheral neuropathy due to chemotherapy (HCC)      Physical Exam:   Vitals:    02/09/23 0914   Temp: 98.6 °F (37 °C)   Weight: 83.9 kg (185 lb)   Height: 185.4 cm (73\")   PainSc: 0-No pain     Well developed with normal mood.  Exam her left foot incision is well-healed there is minimal swelling and without focal tenderness along the fifth metatarsal.  The foot was grossly sensate light touch.      Radiology: 3 views of the left foot ordered evaluate fracture alignment reviewed and compared to previous x-rays these show good alignment to the fifth metatarsal fracture which appears to be healing well and hardware is intact.      Assessment/Plan: Left fifth oblique distal metatarsal fracture status post ORIF    She seems to be improving well.  Will allow her to progress to 50% weightbearing with 2 crutches and postop shoe for a week and then 1 crutch on the contralateral arm with postop shoe for a week and if doing well may then go to just her postop shoe.    After the visit I subsequently spoke with her and work was anxious for her to return but would not allow her to use crutches at work and she may use her knee scooter at work and progress as outlined above when not at work and in 2 weeks if doing well could go to work with just her postop shoe.    We will see her back in 3 weeks with x-rays of her left foot       "

## 2023-03-01 RX ORDER — ONDANSETRON 4 MG/1
4 TABLET, FILM COATED ORAL EVERY 8 HOURS PRN
Qty: 20 TABLET | Refills: 0 | Status: SHIPPED | OUTPATIENT
Start: 2023-03-01 | End: 2023-03-30

## 2023-03-02 ENCOUNTER — OFFICE VISIT (OUTPATIENT)
Dept: ORTHOPEDIC SURGERY | Facility: CLINIC | Age: 43
End: 2023-03-02
Payer: COMMERCIAL

## 2023-03-02 VITALS — WEIGHT: 185 LBS | BODY MASS INDEX: 25.06 KG/M2 | TEMPERATURE: 97.5 F | HEIGHT: 72 IN

## 2023-03-02 DIAGNOSIS — S92.352D CLOSED DISPLACED FRACTURE OF FIFTH METATARSAL BONE OF LEFT FOOT WITH ROUTINE HEALING, SUBSEQUENT ENCOUNTER: ICD-10-CM

## 2023-03-02 DIAGNOSIS — R52 PAIN: Primary | ICD-10-CM

## 2023-03-02 PROCEDURE — 99024 POSTOP FOLLOW-UP VISIT: CPT | Performed by: ORTHOPAEDIC SURGERY

## 2023-03-02 PROCEDURE — 73630 X-RAY EXAM OF FOOT: CPT | Performed by: ORTHOPAEDIC SURGERY

## 2023-03-02 NOTE — PROGRESS NOTES
"Foot Follow Up      Patient: Linda Marrufo    YOB: 1980 42 y.o. female    Chief Complaints: Foot \"feeling good\"    History of Present Illness:Patient follows up from ORIF of comminuted oblique distal fifth metatarsal fracture with plating and DBM bone grafting on 12/20/2022.     She seen on 1/4/2023 reporting that she was doing well and really not having any appreciable pain but did still feel she has some numbness in her leg and foot residual from her block.  She d did feel like the numbness was improving.  Current pain was rated 0 out of 10.     We discussed treatment options and there was no sign of RSD nor infection and was having numbness outside the areas of surgery so I felt it was residual from her block.     Sutures removed and Steri-Strips were applied and she was placed back into a forefoot and ankle dressing with instructions to continue with elevation and nonweightbearing     She was seen on 1/19/2023 stating that she felt fine.    She still had numbness and was then getting little bit of tingling in her lateral leg and foot but did feel like it is improving.  She was not having any neuropathic pain.     At that time her seem to be healing well and she denied any sign of infection or RSD.  She was left out of her wrapping and instructed on wound care and to continue nonweightbearing for another 10 days and then progress to partial foot flat weightbearing postoperative shoe and crutches.  I was encouraged that her neuritic symptoms of numbness were improving and that the tingling may be some return of nerve sensation but did not see anything as far as RSD or different to do at that time.     She i was seen on 2/9/2023 stating that her foot felt good.  The sensation was returning to her leg with still a bit diminished sensation laterally but no further tingling.  Pain was rated 0 out of 10.    I was encouraged that symptoms are improving and she would like to progress to 50% " "weightbearing with 2 crutches and postop shoe for a week and 1 crutch for a week and then to just her postop shoe.  She is anxious to return to work and I would not allow her to use crutches but she could use a knee scooter and instructed to do that until she could go to work with just her postop shoe.    She is seen back today saying that she is feeling good she been off her crutches for about a week with no complaints of pain.  She was not allowed to return to work while still in a postop shoe.    HPI    ROS: No foot pain  Past Medical History:   Diagnosis Date   • Fracture, foot 11.27    surgery 12.20.22   • HA (headache)    • History of weight gain    • Hodgkin disease (HCC)     RELAPSED   • Hypothyroidism    • Lymphoma (HCC)    • Ovarian failure due to cancer therapy    • Peripheral neuropathy due to chemotherapy (HCC)      Physical Exam:   Vitals:    03/02/23 1025   Temp: 97.5 °F (36.4 °C)   Weight: 83.9 kg (185 lb)   Height: 185.4 cm (73\")   PainSc: 0-No pain     Well developed with normal mood.  On exam she is nontender over the left fifth metatarsal to palpation had good sensation to light touch was able to flex and extend her toes relatively well.      Radiology: 3 views of the left foot ordered evaluate postoperative alignment reviewed and compared to previous x-rays.  Fifth metatarsal fracture appears to be healing in good alignment fracture lines are less evident and hardware is intact.      Assessment/Plan: Left fifth oblique distal metatarsal fracture status post ORIF    Overall she seems to be doing well and fracture appears to be healing and clinically she is improved.    Plan to continue with postop shoe for another 10 days then start gradually working into a stiff sturdy hiking type shoe initially around the house and then out of the house may return to work in this.    We will see her back in 3 weeks with x-rays of her left foot.  "

## 2023-03-30 ENCOUNTER — OFFICE VISIT (OUTPATIENT)
Dept: ORTHOPEDIC SURGERY | Facility: CLINIC | Age: 43
End: 2023-03-30
Payer: COMMERCIAL

## 2023-03-30 VITALS — BODY MASS INDEX: 25.19 KG/M2 | HEIGHT: 72 IN | TEMPERATURE: 97.8 F | WEIGHT: 186 LBS

## 2023-03-30 DIAGNOSIS — R52 PAIN: Primary | ICD-10-CM

## 2023-03-30 DIAGNOSIS — M77.42 METATARSALGIA OF LEFT FOOT: ICD-10-CM

## 2023-03-30 DIAGNOSIS — S92.352D CLOSED DISPLACED FRACTURE OF FIFTH METATARSAL BONE OF LEFT FOOT WITH ROUTINE HEALING, SUBSEQUENT ENCOUNTER: ICD-10-CM

## 2023-03-30 NOTE — PROGRESS NOTES
Foot Follow Up      Patient: Linda Marrufo    YOB: 1980 43 y.o. female    Chief Complaints: Foot is a little sore    History of Present Illness:Patient follows up from ORIF of comminuted oblique distal fifth metatarsal fracture with plating and DBM bone grafting on 12/20/2022.     She seen on 1/4/2023 reporting that she was doing well and really not having any appreciable pain but did still feel she has some numbness in her leg and foot residual from her block.  She d did feel like the numbness was improving.  Current pain was rated 0 out of 10.     We discussed treatment options and there was no sign of RSD nor infection and was having numbness outside the areas of surgery so I felt it was residual from her block.     Sutures removed and Steri-Strips were applied and she was placed back into a forefoot and ankle dressing with instructions to continue with elevation and nonweightbearing     She was seen on 1/19/2023 stating that she felt fine.    She still had numbness and was then getting little bit of tingling in her lateral leg and foot but did feel like it is improving.  She was not having any neuropathic pain.     At that time her seem to be healing well and she denied any sign of infection or RSD.  She was left out of her wrapping and instructed on wound care and to continue nonweightbearing for another 10 days and then progress to partial foot flat weightbearing postoperative shoe and crutches.  I was encouraged that her neuritic symptoms of numbness were improving and that the tingling may be some return of nerve sensation but did not see anything as far as RSD or different to do at that time.     She i was seen on 2/9/2023 stating that her foot felt good.  The sensation was returning to her leg with still a bit diminished sensation laterally but no further tingling.  Pain was rated 0 out of 10.     I was encouraged that symptoms are improving and she would like to progress to 50%  "weightbearing with 2 crutches and postop shoe for a week and 1 crutch for a week and then to just her postop shoe.  She is anxious to return to work and I would not allow her to use crutches but she could use a knee scooter and instructed to do that until she could go to work with just her postop shoe.     She was seen on 3/2/2023 reporting that she was feeling good.  She had been off her crutches for about a week with no complaints of pain.  She had not been allowed to return to work (per her company) while still in a postop shoe.    She was instructed to continue with postop shoe for another 10 days and then start gradual working to a stiff sturdy hiking shoe initially around the house and then out and returned to work in such.    She is seen back today stating that she not having any pain along the fifth metatarsal but gets some intermittent aching pain and swelling in the right forefoot.  This been going on for about a week and is only intermittent.  HPI    ROS: Foot pain  Past Medical History:   Diagnosis Date   • Fracture, foot 11.27    surgery 12.20.22   • HA (headache)    • History of weight gain    • Hodgkin disease (HCC)     RELAPSED   • Hypothyroidism    • Lymphoma (HCC)    • Ovarian failure due to cancer therapy    • Peripheral neuropathy due to chemotherapy (HCC)      Physical Exam:   Vitals:    03/30/23 1052   Temp: 97.8 °F (36.6 °C)   Weight: 84.4 kg (186 lb)   Height: 185.4 cm (73\")   PainSc:   3     Well developed with normal mood.  Exam her left foot incision is well-healed there is no pain on the fifth metatarsal.  There are some mild swelling around the second third and fourth MTP joints but no irritability or instability with range of motion and was without gross focal tenderness over the plantarly      Radiology: 3 views of the left foot ordered evaluate pain and alignment reviewed and compared to previous x-rays.  The fifth metatarsal fracture appears to be healing I do not see any obvious " evidence of stress fracture of the other metatarsals.  And hardware appears to be in good alignment.      Assessment/Plan: 1.  Status post ORIF left fifth metatarsal fracture  2.  Left forefoot metatarsalgia    We discussed treatment going forward I do not see any obvious sign of stress fracture or instability to the MTP joints this may be after weightbearing more after being limited in the postop shoe.    However continue with stiff sturdy accommodative shoes such as stiff tennis shoes or hiking shoes and if pain is unrelenting to get into her postop shoe    I recommend heel cord stretching exercises to do at least 4 times a day.  Instruction sheet was provided and demonstrated for the patient. We discussed etiology of heel cord tightness to midfoot and forefoot overload.      She is also instructed on intrinsic stretching and strengthening exercises.  We discussed compounding cream which she declined at this time but we will have her use Voltaren gel and no running or jumping activities.    We will see her back in 6 weeks but if she is not making improvements in 3 weeks she will let us know we will get an MRI of her left foot prior to follow-up.    X-ray left foot at follow-up

## 2023-05-11 ENCOUNTER — OFFICE VISIT (OUTPATIENT)
Dept: ORTHOPEDIC SURGERY | Facility: CLINIC | Age: 43
End: 2023-05-11
Payer: COMMERCIAL

## 2023-05-11 VITALS — TEMPERATURE: 97.7 F | WEIGHT: 186 LBS | BODY MASS INDEX: 25.19 KG/M2 | HEIGHT: 72 IN

## 2023-05-11 DIAGNOSIS — R52 PAIN: Primary | ICD-10-CM

## 2023-05-11 DIAGNOSIS — S92.352D CLOSED DISPLACED FRACTURE OF FIFTH METATARSAL BONE OF LEFT FOOT WITH ROUTINE HEALING, SUBSEQUENT ENCOUNTER: ICD-10-CM

## 2023-05-11 DIAGNOSIS — M77.42 METATARSALGIA OF LEFT FOOT: ICD-10-CM

## 2023-05-11 NOTE — PROGRESS NOTES
"Foot Follow Up      Patient: Linda Marrufo    YOB: 1980 43 y.o. female    Chief Complaints: Foot \"feels great\"    History of Present Illness:Patient follows up from ORIF of comminuted oblique distal fifth metatarsal fracture with plating and DBM bone grafting on 12/20/2022.     She seen on 1/4/2023 reporting that she was doing well and really not having any appreciable pain but did still feel she has some numbness in her leg and foot residual from her block.  She d did feel like the numbness was improving.  Current pain was rated 0 out of 10.     We discussed treatment options and there was no sign of RSD nor infection and was having numbness outside the areas of surgery so I felt it was residual from her block.     Sutures removed and Steri-Strips were applied and she was placed back into a forefoot and ankle dressing with instructions to continue with elevation and nonweightbearing     She was seen on 1/19/2023 stating that she felt fine.    She still had numbness and was then getting little bit of tingling in her lateral leg and foot but did feel like it is improving.  She was not having any neuropathic pain.     At that time her seem to be healing well and she denied any sign of infection or RSD.  She was left out of her wrapping and instructed on wound care and to continue nonweightbearing for another 10 days and then progress to partial foot flat weightbearing postoperative shoe and crutches.  I was encouraged that her neuritic symptoms of numbness were improving and that the tingling may be some return of nerve sensation but did not see anything as far as RSD or different to do at that time.     She i was seen on 2/9/2023 stating that her foot felt good.  The sensation was returning to her leg with still a bit diminished sensation laterally but no further tingling.  Pain was rated 0 out of 10.     I was encouraged that symptoms are improving and she would like to progress to 50% " weightbearing with 2 crutches and postop shoe for a week and 1 crutch for a week and then to just her postop shoe.  She is anxious to return to work and I would not allow her to use crutches but she could use a knee scooter and instructed to do that until she could go to work with just her postop shoe.     She was seen on 3/2/2023 reporting that she was feeling good.  She had been off her crutches for about a week with no complaints of pain.  She had not been allowed to return to work (per her company) while still in a postop shoe.     She was instructed to continue with postop shoe for another 10 days and then start gradual working to a stiff sturdy hiking shoe initially around the house and then out and returned to work in such.     She seen on 3/30/2023 stating that she was not having any pain along the fifth metatarsal but got some intermittent aching pain and swelling in the right forefoot.  This had been going on for about a week and was only intermittent.    We discussed treatment going forward and did not see any obvious sign of stress fracture or instability to the MTP joints and felt this could have been after some weightbearing after she did limited in her postoperative shoe.  She was instructed continue with stiff accommodative shoes such as stiff tennis shoes or hiking shoes and if she had unrelenting pain to get into her postoperative shoe.  She was instructed on heel cord stretching exercises and use of Voltaren gel and let me know if symptoms were not improving so we can get an MRI    She is seen back today stating that her foot feels great.  She used the Voltaren gel and the heel cord stretching.  Symptoms in the forefoot have resolved and she is not having any pain in the fifth metatarsal.  Pain is rated 0 out of 10      HPI    ROS: No foot pain  Past Medical History:   Diagnosis Date   • Fracture, foot 11.27    surgery 12.20.22   • HA (headache)    • History of weight gain    • Hodgkin disease      "RELAPSED   • Hypothyroidism    • Lymphoma    • Ovarian failure due to cancer therapy    • Peripheral neuropathy due to chemotherapy      Physical Exam:   Vitals:    05/11/23 1533   Temp: 97.7 °F (36.5 °C)   Weight: 84.4 kg (186 lb)   Height: 185.4 cm (73\")   PainSc: 0-No pain     Well developed with normal mood.  On exam of the left foot she was completely nontender along the incision.  She asked me about a small area that was darkened at the proximal extent of the incision and I did remove a few small flecks of Vicryl suture with there is no sign of infection and no tenderness.  She had no pain of the forefoot and no pain with MTP motion.      Radiology: 3 views of the left foot ordered evaluate postoperative alignment reviewed and compared to previous x-rays.  The fifth metatarsal fracture appears healed and hardware remains intact.      Assessment/Plan: Status post ORIF left fifth metatarsal fracture with resolved left forefoot metatarsalgia    Overall she seems to be doing well and I do not see any sign of infection or current problems.    She is pleased with her outcome and may advance activities as tolerated    We had a pleasant visit and I will see her back as needed  "

## 2023-06-01 RX ORDER — PROMETHAZINE HYDROCHLORIDE 12.5 MG/1
12.5 TABLET ORAL EVERY 12 HOURS PRN
Qty: 12 TABLET | Refills: 0 | Status: SHIPPED | OUTPATIENT
Start: 2023-06-01

## 2023-06-02 DIAGNOSIS — C81.21 MIXED CELLULARITY HODGKIN LYMPHOMA OF LYMPH NODES OF NECK: ICD-10-CM

## 2023-06-02 DIAGNOSIS — E89.40 OVARIAN FAILURE DUE TO CANCER THERAPY: Primary | ICD-10-CM

## 2023-06-14 ENCOUNTER — LAB (OUTPATIENT)
Dept: LAB | Facility: HOSPITAL | Age: 43
End: 2023-06-14
Payer: COMMERCIAL

## 2023-06-14 ENCOUNTER — OFFICE VISIT (OUTPATIENT)
Dept: ONCOLOGY | Facility: CLINIC | Age: 43
End: 2023-06-14
Payer: COMMERCIAL

## 2023-06-14 VITALS
RESPIRATION RATE: 18 BRPM | TEMPERATURE: 97.3 F | HEART RATE: 76 BPM | OXYGEN SATURATION: 98 % | HEIGHT: 72 IN | WEIGHT: 187.8 LBS | DIASTOLIC BLOOD PRESSURE: 74 MMHG | BODY MASS INDEX: 25.44 KG/M2 | SYSTOLIC BLOOD PRESSURE: 112 MMHG

## 2023-06-14 DIAGNOSIS — E89.40 OVARIAN FAILURE DUE TO CANCER THERAPY: ICD-10-CM

## 2023-06-14 DIAGNOSIS — C81.21 MIXED CELLULARITY HODGKIN LYMPHOMA OF LYMPH NODES OF NECK: Primary | ICD-10-CM

## 2023-06-14 DIAGNOSIS — C81.21 MIXED CELLULARITY HODGKIN LYMPHOMA OF LYMPH NODES OF NECK: ICD-10-CM

## 2023-06-14 LAB
ALBUMIN SERPL-MCNC: 4.4 G/DL (ref 3.5–5.2)
ALBUMIN/GLOB SERPL: 1.8 G/DL (ref 1.1–2.4)
ALP SERPL-CCNC: 54 U/L (ref 38–116)
ALT SERPL W P-5'-P-CCNC: 15 U/L (ref 0–33)
ANION GAP SERPL CALCULATED.3IONS-SCNC: 11 MMOL/L (ref 5–15)
AST SERPL-CCNC: 20 U/L (ref 0–32)
BASOPHILS # BLD AUTO: 0.03 10*3/MM3 (ref 0–0.2)
BASOPHILS NFR BLD AUTO: 0.5 % (ref 0–1.5)
BILIRUB SERPL-MCNC: 0.6 MG/DL (ref 0.2–1.2)
BUN SERPL-MCNC: 10 MG/DL (ref 6–20)
BUN/CREAT SERPL: 11 (ref 7.3–30)
CALCIUM SPEC-SCNC: 9.7 MG/DL (ref 8.5–10.2)
CHLORIDE SERPL-SCNC: 104 MMOL/L (ref 98–107)
CO2 SERPL-SCNC: 26 MMOL/L (ref 22–29)
CREAT SERPL-MCNC: 0.91 MG/DL (ref 0.6–1.1)
DEPRECATED RDW RBC AUTO: 38.2 FL (ref 37–54)
EGFRCR SERPLBLD CKD-EPI 2021: 80.4 ML/MIN/1.73
EOSINOPHIL # BLD AUTO: 0.04 10*3/MM3 (ref 0–0.4)
EOSINOPHIL NFR BLD AUTO: 0.7 % (ref 0.3–6.2)
ERYTHROCYTE [DISTWIDTH] IN BLOOD BY AUTOMATED COUNT: 11.8 % (ref 12.3–15.4)
GLOBULIN UR ELPH-MCNC: 2.5 GM/DL (ref 1.8–3.5)
GLUCOSE SERPL-MCNC: 98 MG/DL (ref 74–124)
HCT VFR BLD AUTO: 38.6 % (ref 34–46.6)
HGB BLD-MCNC: 13 G/DL (ref 12–15.9)
IMM GRANULOCYTES # BLD AUTO: 0.01 10*3/MM3 (ref 0–0.05)
IMM GRANULOCYTES NFR BLD AUTO: 0.2 % (ref 0–0.5)
LYMPHOCYTES # BLD AUTO: 1.96 10*3/MM3 (ref 0.7–3.1)
LYMPHOCYTES NFR BLD AUTO: 34.6 % (ref 19.6–45.3)
MCH RBC QN AUTO: 30 PG (ref 26.6–33)
MCHC RBC AUTO-ENTMCNC: 33.7 G/DL (ref 31.5–35.7)
MCV RBC AUTO: 89.1 FL (ref 79–97)
MONOCYTES # BLD AUTO: 0.43 10*3/MM3 (ref 0.1–0.9)
MONOCYTES NFR BLD AUTO: 7.6 % (ref 5–12)
NEUTROPHILS NFR BLD AUTO: 3.2 10*3/MM3 (ref 1.7–7)
NEUTROPHILS NFR BLD AUTO: 56.4 % (ref 42.7–76)
NRBC BLD AUTO-RTO: 0 /100 WBC (ref 0–0.2)
PLATELET # BLD AUTO: 196 10*3/MM3 (ref 140–450)
PMV BLD AUTO: 8.2 FL (ref 6–12)
POTASSIUM SERPL-SCNC: 3.9 MMOL/L (ref 3.5–4.7)
PROT SERPL-MCNC: 6.9 G/DL (ref 6.3–8)
RBC # BLD AUTO: 4.33 10*6/MM3 (ref 3.77–5.28)
SODIUM SERPL-SCNC: 141 MMOL/L (ref 134–145)
T4 FREE SERPL-MCNC: 1.71 NG/DL (ref 0.93–1.7)
TSH SERPL DL<=0.05 MIU/L-ACNC: 1.17 UIU/ML (ref 0.27–4.2)
WBC NRBC COR # BLD: 5.67 10*3/MM3 (ref 3.4–10.8)

## 2023-06-14 PROCEDURE — 36415 COLL VENOUS BLD VENIPUNCTURE: CPT

## 2023-06-14 PROCEDURE — 80050 GENERAL HEALTH PANEL: CPT

## 2023-06-14 PROCEDURE — 84439 ASSAY OF FREE THYROXINE: CPT | Performed by: INTERNAL MEDICINE

## 2023-06-14 NOTE — PROGRESS NOTES
Subjective   REASONS FOR FOLLOWUP:     Relapsed Hodgkin disease, now status post high dose therapy and autologous stem cell rescue at [place] on 01/28/2011.   Radiation therapy to the left neck and supraclavicular area at [facility] with Dr. Wilkerson completed 04/28/2011.   Ovarian failure following chemotherapy currently on hormone replacement and following up with Dr. Concetta Duncan, OB-GYN.   Hypothyroidism on Synthroid replacement.     HISTORY OF PRESENT ILLNESS:   Linda is a 43 y.o. female with the above-noted history of recurrent Hodgkin disease treated with high-dose therapy and stem cell transplant at [facility] in January 2011. Following her transplant, she also received radiation therapy to the left neck and supraclavicular area which she completed in April 2011.    She is here today for routine annual  followup and seems to be doing well now 12 years out from her stem cell transplant.  Since her visit here last year she fractured the fifth metatarsal in her left foot and required surgery.  She now is fully healed.    She is currently scheduled for her screening mammogram in July 2023    Her blood counts in the office today are unremarkable.  Her TSH level is pending.    Current levothyroxine dose is 100 mcg daily.       History of Present Illness      Past Medical History, Past Surgical History, Social History, Family History have been reviewed and are without significant changes except as mentioned.    Review of Systems   Constitutional:  Negative for activity change, appetite change, fatigue, fever and unexpected weight change.   HENT:  Negative for hearing loss, nosebleeds, trouble swallowing and voice change.    Eyes:  Negative for visual disturbance.   Respiratory:  Negative for cough, shortness of breath and wheezing.    Cardiovascular:  Negative for chest pain and palpitations.   Gastrointestinal:  Negative for abdominal pain, diarrhea, nausea and  "vomiting.   Genitourinary:  Negative for difficulty urinating, frequency, hematuria and urgency.   Musculoskeletal:  Negative for back pain and neck pain.   Skin:  Negative for rash.   Neurological:  Negative for dizziness, seizures, syncope and headaches.   Hematological:  Negative for adenopathy. Does not bruise/bleed easily.   Psychiatric/Behavioral:  Negative for behavioral problems. The patient is not nervous/anxious.     A comprehensive 14 point review of systems was performed and was negative except as mentioned.    Medications:  The current medication list was reviewed in the EMR    ALLERGIES:  No Known Allergies    Objective      Vitals:    06/14/23 1057   BP: 112/74   Pulse: 76   Resp: 18   Temp: 97.3 °F (36.3 °C)   TempSrc: Temporal   SpO2: 98%   Weight: 85.2 kg (187 lb 12.8 oz)   Height: 185.4 cm (72.99\")   PainSc: 0-No pain         6/14/2023    11:03 AM   Current Status   ECOG score 0       Physical Exam   Constitutional: She is oriented to person, place, and time. She appears well-developed. No distress.   HENT:   Head: Normocephalic.   Eyes: Pupils are equal, round, and reactive to light. Conjunctivae are normal. No scleral icterus.   Neck: No JVD present. No thyromegaly present.   Cardiovascular: Normal rate and regular rhythm. Exam reveals no gallop and no friction rub.   No murmur heard.  Pulmonary/Chest: Effort normal and breath sounds normal. She has no wheezes. She has no rales.   Abdominal: Soft. She exhibits no distension and no mass. There is no abdominal tenderness.   Musculoskeletal: Normal range of motion. No deformity.   Lymphadenopathy:     She has no cervical adenopathy.   Neurological: She is alert and oriented to person, place, and time. She has normal reflexes. No cranial nerve deficit.   Skin: Skin is warm and dry. No rash noted. No erythema.   Psychiatric: Her behavior is normal. Judgment normal.       RECENT LABS:  Hematology WBC   Date Value Ref Range Status   06/14/2023 5.67 " 3.40 - 10.80 10*3/mm3 Final     RBC   Date Value Ref Range Status   06/14/2023 4.33 3.77 - 5.28 10*6/mm3 Final     Hemoglobin   Date Value Ref Range Status   06/14/2023 13.0 12.0 - 15.9 g/dL Final     Hematocrit   Date Value Ref Range Status   06/14/2023 38.6 34.0 - 46.6 % Final     Platelets   Date Value Ref Range Status   06/14/2023 196 140 - 450 10*3/mm3 Final            Lab Results   Component Value Date    TSH 0.181 (L) 03/09/2022       Assessment & Plan   ASSESSMENT:   1. Hodgkin disease in remission following high dose therapy and autologous stem cell transplant at [place] in late January 2011. The patient continues to do well with no evidence of disease, now over 12 years out from her transplant.   2. Post-transplant consolidation radiation left neck and supraclavicular area completed 04/28/2011.   3. Ovarian failure following high-dose chemotherapy. The patient continues to followup with her OB/GYN, Dr. Concetta Duncan.                   4. Hypothyroidism, currently on Synthroid replacement.           PLAN:   Eloy will be scheduled to return for routine follow-up in 12 months  Since her initial chest irradiation was in 2010 she started breast imaging in 2019 when she was 38.  Her next screening mammogram is due in July.  We will continue to monitor her thyroid studies and adjust her Synthroid as needed.  We again encouraged her to look for a primary care physician.                  6/14/2023      CC:

## 2023-11-03 DIAGNOSIS — Z12.31 VISIT FOR SCREENING MAMMOGRAM: Primary | ICD-10-CM

## 2024-02-02 RX ORDER — ONDANSETRON 4 MG/1
4 TABLET, FILM COATED ORAL EVERY 6 HOURS PRN
Qty: 30 TABLET | Refills: 1 | Status: SHIPPED | OUTPATIENT
Start: 2024-02-02

## 2024-03-04 RX ORDER — LEVOTHYROXINE SODIUM 0.1 MG/1
TABLET ORAL
Qty: 90 TABLET | Refills: 0 | Status: SHIPPED | OUTPATIENT
Start: 2024-03-04

## 2024-04-03 ENCOUNTER — LAB (OUTPATIENT)
Dept: LAB | Facility: HOSPITAL | Age: 44
End: 2024-04-03
Payer: COMMERCIAL

## 2024-04-03 ENCOUNTER — OFFICE VISIT (OUTPATIENT)
Dept: ONCOLOGY | Facility: CLINIC | Age: 44
End: 2024-04-03
Payer: COMMERCIAL

## 2024-04-03 VITALS
TEMPERATURE: 98.4 F | SYSTOLIC BLOOD PRESSURE: 99 MMHG | RESPIRATION RATE: 18 BRPM | BODY MASS INDEX: 28.05 KG/M2 | HEIGHT: 72 IN | OXYGEN SATURATION: 100 % | HEART RATE: 79 BPM | DIASTOLIC BLOOD PRESSURE: 57 MMHG | WEIGHT: 207.1 LBS

## 2024-04-03 DIAGNOSIS — E89.40 OVARIAN FAILURE DUE TO CANCER THERAPY: ICD-10-CM

## 2024-04-03 DIAGNOSIS — E03.9 HYPOTHYROIDISM, ACQUIRED: ICD-10-CM

## 2024-04-03 DIAGNOSIS — C81.21 MIXED CELLULARITY HODGKIN LYMPHOMA OF LYMPH NODES OF NECK: ICD-10-CM

## 2024-04-03 DIAGNOSIS — C81.21 MIXED CELLULARITY HODGKIN LYMPHOMA OF LYMPH NODES OF NECK: Primary | ICD-10-CM

## 2024-04-03 LAB
ALBUMIN SERPL-MCNC: 4.7 G/DL (ref 3.5–5.2)
ALBUMIN/GLOB SERPL: 2.1 G/DL
ALP SERPL-CCNC: 42 U/L (ref 39–117)
ALT SERPL W P-5'-P-CCNC: 30 U/L (ref 1–33)
ANION GAP SERPL CALCULATED.3IONS-SCNC: 10 MMOL/L (ref 5–15)
AST SERPL-CCNC: 28 U/L (ref 1–32)
BASOPHILS # BLD AUTO: 0.03 10*3/MM3 (ref 0–0.2)
BASOPHILS NFR BLD AUTO: 0.7 % (ref 0–1.5)
BILIRUB SERPL-MCNC: 0.3 MG/DL (ref 0–1.2)
BUN SERPL-MCNC: 12 MG/DL (ref 6–20)
BUN/CREAT SERPL: 14.5 (ref 7–25)
CALCIUM SPEC-SCNC: 9.6 MG/DL (ref 8.6–10.5)
CHLORIDE SERPL-SCNC: 104 MMOL/L (ref 98–107)
CO2 SERPL-SCNC: 27 MMOL/L (ref 22–29)
CREAT SERPL-MCNC: 0.83 MG/DL (ref 0.57–1)
DEPRECATED RDW RBC AUTO: 40.8 FL (ref 37–54)
EGFRCR SERPLBLD CKD-EPI 2021: 89.3 ML/MIN/1.73
EOSINOPHIL # BLD AUTO: 0.04 10*3/MM3 (ref 0–0.4)
EOSINOPHIL NFR BLD AUTO: 0.9 % (ref 0.3–6.2)
ERYTHROCYTE [DISTWIDTH] IN BLOOD BY AUTOMATED COUNT: 12.3 % (ref 12.3–15.4)
GLOBULIN UR ELPH-MCNC: 2.2 GM/DL
GLUCOSE SERPL-MCNC: 86 MG/DL (ref 65–99)
HCT VFR BLD AUTO: 37.6 % (ref 34–46.6)
HGB BLD-MCNC: 12.7 G/DL (ref 12–15.9)
IMM GRANULOCYTES # BLD AUTO: 0.01 10*3/MM3 (ref 0–0.05)
IMM GRANULOCYTES NFR BLD AUTO: 0.2 % (ref 0–0.5)
LYMPHOCYTES # BLD AUTO: 1.73 10*3/MM3 (ref 0.7–3.1)
LYMPHOCYTES NFR BLD AUTO: 37.8 % (ref 19.6–45.3)
MCH RBC QN AUTO: 30.9 PG (ref 26.6–33)
MCHC RBC AUTO-ENTMCNC: 33.8 G/DL (ref 31.5–35.7)
MCV RBC AUTO: 91.5 FL (ref 79–97)
MONOCYTES # BLD AUTO: 0.33 10*3/MM3 (ref 0.1–0.9)
MONOCYTES NFR BLD AUTO: 7.2 % (ref 5–12)
NEUTROPHILS NFR BLD AUTO: 2.44 10*3/MM3 (ref 1.7–7)
NEUTROPHILS NFR BLD AUTO: 53.2 % (ref 42.7–76)
NRBC BLD AUTO-RTO: 0 /100 WBC (ref 0–0.2)
PLATELET # BLD AUTO: 197 10*3/MM3 (ref 140–450)
PMV BLD AUTO: 7.9 FL (ref 6–12)
POTASSIUM SERPL-SCNC: 4 MMOL/L (ref 3.5–5.2)
PROT SERPL-MCNC: 6.9 G/DL (ref 6–8.5)
RBC # BLD AUTO: 4.11 10*6/MM3 (ref 3.77–5.28)
SODIUM SERPL-SCNC: 141 MMOL/L (ref 136–145)
T4 FREE SERPL-MCNC: 1.09 NG/DL (ref 0.93–1.7)
TSH SERPL DL<=0.05 MIU/L-ACNC: 1.35 UIU/ML (ref 0.27–4.2)
WBC NRBC COR # BLD AUTO: 4.58 10*3/MM3 (ref 3.4–10.8)

## 2024-04-03 PROCEDURE — 84439 ASSAY OF FREE THYROXINE: CPT | Performed by: INTERNAL MEDICINE

## 2024-04-03 PROCEDURE — 80050 GENERAL HEALTH PANEL: CPT

## 2024-04-03 PROCEDURE — 36415 COLL VENOUS BLD VENIPUNCTURE: CPT

## 2024-04-03 RX ORDER — CHLORHEXIDINE GLUCONATE ORAL RINSE 1.2 MG/ML
SOLUTION DENTAL
COMMUNITY
Start: 2024-01-24

## 2024-04-03 NOTE — PROGRESS NOTES
Subjective   REASONS FOR FOLLOWUP:     Relapsed Hodgkin disease, now status post high dose therapy and autologous stem cell rescue at [place] on 01/28/2011.   Radiation therapy to the left neck and supraclavicular area at [facility] with Dr. Wilkerson completed 04/28/2011.   Ovarian failure following chemotherapy currently on hormone replacement and following up with Dr. Concetta Duncan, OB-GYN.   Hypothyroidism on Synthroid replacement.     HISTORY OF PRESENT ILLNESS:   Linda is a 44 y.o. female with the above-noted history of recurrent Hodgkin disease treated with high-dose therapy and stem cell transplant at [facility] in January 2011. Following her transplant, she also received radiation therapy to the left neck and supraclavicular area which she completed in April 2011.    She is here today for routine annual  followup and seems to be doing well now 12 years out from her stem cell transplant.  Since her visit here last year she fractured the fifth metatarsal in her left foot and required surgery.  She now is fully healed.    She is currently scheduled for her screening mammogram in July 2023    Her blood counts in the office today are unremarkable.  Her TSH level is pending.    Current levothyroxine dose is 100 mcg daily.       History of Present Illness      Past Medical History, Past Surgical History, Social History, Family History have been reviewed and are without significant changes except as mentioned.    Review of Systems   Constitutional:  Negative for activity change, appetite change, fatigue, fever and unexpected weight change.   HENT:  Negative for hearing loss, nosebleeds, trouble swallowing and voice change.    Eyes:  Negative for visual disturbance.   Respiratory:  Negative for cough, shortness of breath and wheezing.    Cardiovascular:  Negative for chest pain and palpitations.   Gastrointestinal:  Negative for abdominal pain, diarrhea, nausea and  "vomiting.   Genitourinary:  Negative for difficulty urinating, frequency, hematuria and urgency.   Musculoskeletal:  Negative for back pain and neck pain.   Skin:  Negative for rash.   Neurological:  Negative for dizziness, seizures, syncope and headaches.   Hematological:  Negative for adenopathy. Does not bruise/bleed easily.   Psychiatric/Behavioral:  Negative for behavioral problems. The patient is not nervous/anxious.       A comprehensive 14 point review of systems was performed and was negative except as mentioned.    Medications:  The current medication list was reviewed in the EMR    ALLERGIES:  No Known Allergies    Objective      Vitals:    04/03/24 1050   BP: 99/57   Pulse: 79   Resp: 18   Temp: 98.4 °F (36.9 °C)   TempSrc: Temporal   SpO2: 100%   Weight: 93.9 kg (207 lb 1.6 oz)   Height: 185.4 cm (72.99\")   PainSc: 0-No pain         4/3/2024    10:43 AM   Current Status   ECOG score 0       Physical Exam   Constitutional: She is oriented to person, place, and time. She appears well-developed. No distress.   HENT:   Head: Normocephalic.   Eyes: Pupils are equal, round, and reactive to light. Conjunctivae are normal. No scleral icterus.   Neck: No JVD present. No thyromegaly present.   Cardiovascular: Normal rate and regular rhythm. Exam reveals no gallop and no friction rub.   No murmur heard.  Pulmonary/Chest: Effort normal and breath sounds normal. She has no wheezes. She has no rales.   Abdominal: Soft. She exhibits no distension and no mass. There is no abdominal tenderness.   Musculoskeletal: Normal range of motion. No deformity.   Lymphadenopathy:     She has no cervical adenopathy.   Neurological: She is alert and oriented to person, place, and time. She has normal reflexes. No cranial nerve deficit.   Skin: Skin is warm and dry. No rash noted. No erythema.   Psychiatric: Her behavior is normal. Judgment normal.         RECENT LABS:  Hematology WBC   Date Value Ref Range Status   04/03/2024 4.58 " 3.40 - 10.80 10*3/mm3 Final     RBC   Date Value Ref Range Status   04/03/2024 4.11 3.77 - 5.28 10*6/mm3 Final     Hemoglobin   Date Value Ref Range Status   04/03/2024 12.7 12.0 - 15.9 g/dL Final     Hematocrit   Date Value Ref Range Status   04/03/2024 37.6 34.0 - 46.6 % Final     Platelets   Date Value Ref Range Status   04/03/2024 197 140 - 450 10*3/mm3 Final            Lab Results   Component Value Date    TSH 1.170 06/14/2023       Assessment & Plan   ASSESSMENT:   1. Hodgkin disease in remission following high dose therapy and autologous stem cell transplant at [place] in late January 2011. The patient continues to do well with no evidence of disease, now over 13 years out from her transplant.   2. Post-transplant consolidation radiation left neck and supraclavicular area completed 04/28/2011.   3. Ovarian failure following high-dose chemotherapy. The patient continues to followup with her OB/GYN, Dr. Concetta Duncan.                   4. Hypothyroidism, currently on Synthroid replacement 100 mcg daily.           PLAN:   Eloy will be scheduled to return for routine follow-up in 12 months  Since her initial chest irradiation was in 2010 she started breast imaging in 2019 when she was 38.  Her next screening mammogram is due in July.  We will continue to monitor her thyroid studies and adjust her Synthroid as needed.  We again encouraged her to look for a primary care physician.                  4/3/2024      CC:

## 2024-05-03 ENCOUNTER — DOCUMENTATION (OUTPATIENT)
Dept: INTERNAL MEDICINE | Facility: HOSPITAL | Age: 44
End: 2024-05-03
Payer: COMMERCIAL

## 2024-05-03 RX ORDER — ONDANSETRON 4 MG/1
4 TABLET, FILM COATED ORAL EVERY 8 HOURS PRN
Qty: 20 TABLET | Refills: 0 | Status: SHIPPED | OUTPATIENT
Start: 2024-05-03

## 2024-07-29 RX ORDER — AZITHROMYCIN 250 MG/1
TABLET, FILM COATED ORAL
Qty: 6 TABLET | Refills: 0 | Status: SHIPPED | OUTPATIENT
Start: 2024-07-29

## 2024-07-29 RX ORDER — PREDNISONE 20 MG/1
20 TABLET ORAL DAILY
Qty: 5 TABLET | Refills: 0 | Status: SHIPPED | OUTPATIENT
Start: 2024-07-29 | End: 2024-08-03

## 2024-08-01 ENCOUNTER — TELEPHONE (OUTPATIENT)
Dept: OBSTETRICS AND GYNECOLOGY | Age: 44
End: 2024-08-01

## 2024-08-01 NOTE — TELEPHONE ENCOUNTER
"    Caller: Linda Marrufo \"Kayla\"    Relationship to patient: Self    Best call back number: [phone]    Patient is needing: PT IS SCHEDULED FOR ANNUAL EXAM THURSDAY 8/8/2024 @ 11AM. PT WOULD LIKE TO SCHEDULE FOR MAMMOGRAM    "

## 2024-08-08 ENCOUNTER — OFFICE VISIT (OUTPATIENT)
Dept: OBSTETRICS AND GYNECOLOGY | Age: 44
End: 2024-08-08
Payer: COMMERCIAL

## 2024-08-08 ENCOUNTER — HOSPITAL ENCOUNTER (OUTPATIENT)
Facility: HOSPITAL | Age: 44
Discharge: HOME | End: 2024-08-08
Admitting: OBSTETRICS & GYNECOLOGY
Payer: COMMERCIAL

## 2024-08-08 VITALS — BODY MASS INDEX: 26.52 KG/M2 | SYSTOLIC BLOOD PRESSURE: 122 MMHG | DIASTOLIC BLOOD PRESSURE: 74 MMHG | WEIGHT: 201 LBS

## 2024-08-08 DIAGNOSIS — Z31.9 PATIENT DESIRES PREGNANCY: ICD-10-CM

## 2024-08-08 DIAGNOSIS — Z12.31 ENCOUNTER FOR SCREENING MAMMOGRAM FOR MALIGNANT NEOPLASM OF BREAST: ICD-10-CM

## 2024-08-08 DIAGNOSIS — Z12.31 VISIT FOR SCREENING MAMMOGRAM: ICD-10-CM

## 2024-08-08 DIAGNOSIS — Z01.419 WELL WOMAN EXAM WITH ROUTINE GYNECOLOGICAL EXAM: Primary | ICD-10-CM

## 2024-08-08 DIAGNOSIS — Z75.8 DOES NOT HAVE PRIMARY CARE PROVIDER: ICD-10-CM

## 2024-08-08 PROCEDURE — 77067 SCR MAMMO BI INCL CAD: CPT

## 2024-08-08 PROCEDURE — 77063 BREAST TOMOSYNTHESIS BI: CPT

## 2024-08-08 NOTE — PROGRESS NOTES
Subjective     Chief Complaint   Patient presents with    Gynecologic Exam     AE, last pap 2022 neg/hpv neg, mg 2024  Cc:  no problems       History of Present Illness    Linda Marrufo is a 44 y.o.  who presents for annual exam.    Doing well  Mammogram today  No menses due to chemo/radiation  Still would like to have baby with donor eggs/sperm.   She has no GYN concerns or complaints  Not SA  Pap UTD    Obstetric History:  OB History          0    Para   0    Term   0       0    AB   0    Living   0         SAB   0    IAB   0    Ectopic   0    Molar        Multiple   0    Live Births                   Menstrual History:     No LMP recorded. (Menstrual status: Chemotherapy/radiation).         Current contraception:  no menses due to chemo/radiation  History of abnormal Pap smear: no  Received Gardasil immunization: no  Perform regular self breast exam: yes - .  Family history of uterine or ovarian cancer: no  Family History of colon cancer: no  Family history of breast cancer: no    Mammogram: done today.  Colonoscopy: not indicated.  DEXA: not indicated.    Exercise: moderately active  Calcium/Vitamin D: adequate intake    The following portions of the patient's history were reviewed and updated as appropriate: allergies, current medications, past family history, past medical history, past social history, past surgical history, and problem list.    Review of Systems   Constitutional: Negative.    Respiratory: Negative.     Cardiovascular: Negative.    Gastrointestinal: Negative.    Genitourinary: Negative.    Skin: Negative.    Psychiatric/Behavioral: Negative.             Objective   Physical Exam  Constitutional:       General: She is awake.      Appearance: Normal appearance. She is well-developed.   HENT:      Head: Normocephalic and atraumatic.      Nose: Nose normal.   Neck:      Thyroid: No thyroid mass, thyromegaly or thyroid tenderness.   Cardiovascular:      Rate and  Rhythm: Normal rate and regular rhythm.      Pulses: Normal pulses.      Heart sounds: Normal heart sounds.   Pulmonary:      Effort: Pulmonary effort is normal.      Breath sounds: Normal breath sounds.   Chest:   Breasts:     Breasts are symmetrical.      Right: Normal. No swelling, bleeding, inverted nipple, mass, nipple discharge, skin change or tenderness.      Left: Normal. No swelling, bleeding, inverted nipple, mass, nipple discharge, skin change or tenderness.   Abdominal:      General: Abdomen is flat. Bowel sounds are normal.      Palpations: Abdomen is soft.      Tenderness: There is no abdominal tenderness.   Genitourinary:     General: Normal vulva.      Labia:         Right: No rash, tenderness, lesion or injury.         Left: No rash, tenderness, lesion or injury.       Urethra: No prolapse, urethral pain, urethral swelling or urethral lesion.      Vagina: Normal. No signs of injury. No vaginal discharge, erythema, tenderness, bleeding, lesions or prolapsed vaginal walls.      Cervix: Normal. No discharge, friability, lesion, erythema or cervical bleeding.      Uterus: Normal. Not enlarged, not tender and no uterine prolapse.       Adnexa: Right adnexa normal and left adnexa normal.        Right: No mass, tenderness or fullness.          Left: No mass, tenderness or fullness.        Rectum: Normal. No mass.   Musculoskeletal:      Cervical back: Normal range of motion and neck supple.   Lymphadenopathy:      Upper Body:      Right upper body: No supraclavicular adenopathy.      Left upper body: No supraclavicular adenopathy.   Skin:     General: Skin is warm and dry.   Neurological:      General: No focal deficit present.      Mental Status: She is alert and oriented to person, place, and time.   Psychiatric:         Mood and Affect: Mood normal.         Behavior: Behavior normal. Behavior is cooperative.         Thought Content: Thought content normal.         Judgment: Judgment normal.         BP  122/74   Wt 91.2 kg (201 lb)   BMI 26.52 kg/m²     Assessment & Plan   Diagnoses and all orders for this visit:    1. Well woman exam with routine gynecological exam (Primary)    2. Encounter for screening mammogram for malignant neoplasm of breast  -     Mammo Screening Digital Tomosynthesis Bilateral With CAD; Future    3. Patient desires pregnancy  -     Cancel: Ambulatory Referral to Infertility  -     Cancel: Ambulatory Referral to Infertility  -     Ambulatory Referral to Infertility    4. Does not have primary care provider  -     Ambulatory Referral to Family Practice        All questions answered.  Breast self exam technique reviewed and patient encouraged to perform self-exam monthly.  Discussed healthy lifestyle modifications.  Recommended 30 minutes of aerobic exercise five times per week.  Discussed calcium needs to prevent osteoporosis.    -Pap UTD  -Mammo done   -Referral placed to infertility for consult  -Referral for PCP  -F/u 1 year

## 2024-08-14 ENCOUNTER — PATIENT ROUNDING (BHMG ONLY) (OUTPATIENT)
Dept: INTERNAL MEDICINE | Facility: CLINIC | Age: 44
End: 2024-08-14
Payer: COMMERCIAL

## 2024-08-14 ENCOUNTER — OFFICE VISIT (OUTPATIENT)
Dept: INTERNAL MEDICINE | Facility: CLINIC | Age: 44
End: 2024-08-14
Payer: COMMERCIAL

## 2024-08-14 VITALS
OXYGEN SATURATION: 97 % | HEIGHT: 72 IN | SYSTOLIC BLOOD PRESSURE: 102 MMHG | HEART RATE: 79 BPM | DIASTOLIC BLOOD PRESSURE: 72 MMHG | BODY MASS INDEX: 27.5 KG/M2 | WEIGHT: 203 LBS | TEMPERATURE: 96.9 F

## 2024-08-14 DIAGNOSIS — C81.21 MIXED CELLULARITY HODGKIN LYMPHOMA OF LYMPH NODES OF NECK: ICD-10-CM

## 2024-08-14 DIAGNOSIS — Z00.00 ENCOUNTER FOR MEDICAL EXAMINATION TO ESTABLISH CARE: ICD-10-CM

## 2024-08-14 DIAGNOSIS — E03.9 HYPOTHYROIDISM, ACQUIRED: Primary | ICD-10-CM

## 2024-08-14 DIAGNOSIS — Z23 NEED FOR STREPTOCOCCUS PNEUMONIAE VACCINATION: ICD-10-CM

## 2024-08-14 PROCEDURE — 90677 PCV20 VACCINE IM: CPT

## 2024-08-14 PROCEDURE — 90471 IMMUNIZATION ADMIN: CPT

## 2024-08-14 PROCEDURE — 99213 OFFICE O/P EST LOW 20 MIN: CPT

## 2024-08-14 RX ORDER — LEVOTHYROXINE SODIUM 0.1 MG/1
100 TABLET ORAL
Qty: 90 TABLET | Refills: 1 | Status: SHIPPED | OUTPATIENT
Start: 2024-08-14

## 2024-08-14 NOTE — PATIENT INSTRUCTIONS
Continue medications as prescribed. Stay active. Stay hydrated with water. Wear sunscreen. Follow-up in 6 months for annual exam and fasting labs.

## 2024-08-14 NOTE — PROGRESS NOTES
"Chief Complaint  Establish Care (Last PCP 15+ years )    Subjective        Linda Marrufo presents to [facility]  History of Present Illness  44-year-old female presenting with hypothyroidism, Hodgkin's disease and to establish care.  Has been seen by Dr. Bhatt in the past for Hodgkin's disease but is establishing with Dr. Palacios since Dr. Bhatt's [facility].  Sees SID Sheikh for gynecology.  Has been taking levothyroxine 100 mcg daily.  Tolerating well.  Most recent labs completed in April.  Has not had her second pneumonia vaccine.      Objective   Vital Signs:  /72 (BP Location: Left arm, Patient Position: Sitting, Cuff Size: Adult)   Pulse 79   Temp 96.9 °F (36.1 °C) (Temporal)   Ht 185.4 cm (72.99\")   Wt 92.1 kg (203 lb)   SpO2 97%   BMI 26.79 kg/m²   Estimated body mass index is 26.79 kg/m² as calculated from the following:    Height as of this encounter: 185.4 cm (72.99\").    Weight as of this encounter: 92.1 kg (203 lb).       BMI is >= 25 and <30. (Overweight) The following options were offered after discussion;: exercise counseling/recommendations and nutrition counseling/recommendations      Physical Exam   Result Review :      Common labs          4/3/2024    10:29   Common Labs   Glucose 86    BUN 12    Creatinine 0.83    Sodium 141    Potassium 4.0    Chloride 104    Calcium 9.6    Albumin 4.7    Total Bilirubin 0.3    Alkaline Phosphatase 42    AST (SGOT) 28    ALT (SGPT) 30    WBC 4.58    Hemoglobin 12.7    Hematocrit 37.6    Platelets 197          Current Outpatient Medications on File Prior to Visit   Medication Sig Dispense Refill    Elderberry 500 MG capsule Take  by mouth.      ondansetron (Zofran) 4 MG tablet Take 1 tablet by mouth Every 6 (Six) Hours As Needed for Nausea or Vomiting. 30 tablet 1    vitamin D3 125 MCG (5000 UT) capsule capsule Take 1 capsule by mouth Daily.      [DISCONTINUED] levothyroxine (SYNTHROID, LEVOTHROID) 100 MCG " tablet TAKE 1 TABLET BY MOUTH EVERY MORNING BEFORE BREAKFAST 90 tablet 0    estradiol (ESTRACE VAGINAL) 0.1 MG/GM vaginal cream Use 1/2 gram per vagina 2-3 times weekly 42.5 g 3    promethazine (PHENERGAN) 12.5 MG tablet Take 1 tablet by mouth Every 12 (Twelve) Hours As Needed for Nausea. (Patient not taking: Reported on 8/14/2024) 12 tablet 0     No current facility-administered medications on file prior to visit.                Assessment and Plan     Diagnoses and all orders for this visit:    1. Hypothyroidism, acquired (Primary)  -     levothyroxine (SYNTHROID, LEVOTHROID) 100 MCG tablet; Take 1 tablet by mouth Every Morning Before Breakfast.  Dispense: 90 tablet; Refill: 1    2. Need for Streptococcus pneumoniae vaccination  -     Pneumococcal Conjugate Vaccine 20-Valent (PCV20)    3. 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 Encounter for medical examination to establish care        Patient Instructions   Continue medications as prescribed. Stay active. Stay hydrated with water. Wear sunscreen. Follow-up in 6 months for annual exam and fasting labs.            Follow Up     Return in about 6 months (around 2/14/2025) for Annual physical.  Patient was given instructions and counseling regarding her condition or for health maintenance advice. Please see specific information pulled into the AVS if appropriate.

## 2024-09-23 ENCOUNTER — TELEPHONE (OUTPATIENT)
Dept: ORTHOPEDIC SURGERY | Facility: CLINIC | Age: 44
End: 2024-09-23
Payer: COMMERCIAL

## 2024-09-23 DIAGNOSIS — E03.9 HYPOTHYROIDISM, ACQUIRED: ICD-10-CM

## 2024-09-23 RX ORDER — LEVOTHYROXINE SODIUM 100 UG/1
100 TABLET ORAL
Qty: 90 TABLET | Refills: 1 | OUTPATIENT
Start: 2024-09-23

## 2024-10-21 ENCOUNTER — OFFICE VISIT (OUTPATIENT)
Dept: ORTHOPEDIC SURGERY | Facility: CLINIC | Age: 44
End: 2024-10-21
Payer: COMMERCIAL

## 2024-10-21 VITALS — BODY MASS INDEX: 28.71 KG/M2 | WEIGHT: 212 LBS | TEMPERATURE: 97.3 F | HEIGHT: 72 IN

## 2024-10-21 DIAGNOSIS — R52 PAIN: Primary | ICD-10-CM

## 2024-10-21 DIAGNOSIS — M77.52 BURSITIS OF INTERMETATARSAL BURSA OF LEFT FOOT: ICD-10-CM

## 2024-10-21 DIAGNOSIS — M77.42 METATARSALGIA OF LEFT FOOT: ICD-10-CM

## 2024-10-21 PROCEDURE — 99214 OFFICE O/P EST MOD 30 MIN: CPT | Performed by: ORTHOPAEDIC SURGERY

## 2024-10-21 PROCEDURE — 73630 X-RAY EXAM OF FOOT: CPT | Performed by: ORTHOPAEDIC SURGERY

## 2024-10-21 NOTE — PROGRESS NOTES
[facility] thank you foot Follow Up      Patient: Linda Marrufo    YOB: 1980 44 y.o. female    Chief Complaints: Foot is swollen    History of Present Illness:Patient follows up from ORIF of comminuted oblique distal fifth metatarsal fracture with plating and DBM bone grafting on 12/20/2022.     She seen on 1/4/2023 reporting that she was doing well and really not having any appreciable pain but did still feel she has some numbness in her leg and foot residual from her block.  She d did feel like the numbness was improving.  Current pain was rated 0 out of 10.     We discussed treatment options and there was no sign of RSD nor infection and was having numbness outside the areas of surgery so I felt it was residual from her block.     Sutures removed and Steri-Strips were applied and she was placed back into a forefoot and ankle dressing with instructions to continue with elevation and nonweightbearing     She was seen on 1/19/2023 stating that she felt fine.    She still had numbness and was then getting little bit of tingling in her lateral leg and foot but did feel like it is improving.  She was not having any neuropathic pain.     At that time her seem to be healing well and she denied any sign of infection or RSD.  She was left out of her wrapping and instructed on wound care and to continue nonweightbearing for another 10 days and then progress to partial foot flat weightbearing postoperative shoe and crutches.  I was encouraged that her neuritic symptoms of numbness were improving and that the tingling may be some return of nerve sensation but did not see anything as far as RSD or different to do at that time.     She i was seen on 2/9/2023 stating that her foot felt good.  The sensation was returning to her leg with still a bit diminished sensation laterally but no further tingling.  Pain was rated 0 out of 10.     I was encouraged that symptoms are improving and she would like to progress to  50% weightbearing with 2 crutches and postop shoe for a week and 1 crutch for a week and then to just her postop shoe.  She is anxious to return to work and I would not allow her to use crutches but she could use a knee scooter and instructed to do that until she could go to work with just her postop shoe.     She was seen on 3/2/2023 reporting that she was feeling good.  She had been off her crutches for about a week with no complaints of pain.  She had not been allowed to return to work (per her company) while still in a postop shoe.     She was instructed to continue with postop shoe for another 10 days and then start gradual working to a stiff sturdy hiking shoe initially around the house and then out and returned to work in such.     She seen on 3/30/2023 stating that she was not having any pain along the fifth metatarsal but got some intermittent aching pain and swelling in the left forefoot.  This had been going on for about a week and was only intermittent.     We discussed treatment going forward and did not see any obvious sign of stress fracture or instability to the MTP joints and felt this could have been after some weightbearing after she did limited in her postoperative shoe.  She was instructed continue with stiff accommodative shoes such as stiff tennis shoes or hiking shoes and if she had unrelenting pain to get into her postoperative shoe.  She was instructed on heel cord stretching exercises and use of Voltaren gel and let me know if symptoms were not improving so we could get an MRI     Patient was seen on 5/11/2023 stating that her foot felt great.  She had used the Voltaren gel and was doing he heel cord stretching.  Symptoms in the forefoot had resolved and she was not having any pain in the fifth metatarsal.  Pain was rated 0 out of 10.    At that time she seemed to be doing well and did not show any sign of current problems or infection.  She would like advance activities as tolerated and  "be seen back as needed.    Patient is seen back today reporting onset of swelling and intermittent discomfort at the base of her fifth metatarsal.  She denied having specific pain at the fracture site..  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  She does get some pain in the morning.  She has had some fullness at the base of the fifth metatarsal.  She continues to play volleyball.  Current pain is rated at 0 out of 10  HPI    ROS: Foot pain  Past Medical History:   Diagnosis Date    Fracture, foot 11.27    surgery 12.20.22    HA (headache)     History of weight gain     Hodgkin disease     RELAPSED    Hypothyroidism     Lymphoma     Ovarian failure due to cancer therapy     Peripheral neuropathy due to chemotherapy      Physical Exam:   Vitals:    10/21/24 0956   Temp: 97.3 °F (36.3 °C)   TempSrc: Temporal   Weight: 96.2 kg (212 lb)   Height: 182.9 cm (72\")   PainSc: 0-No pain   PainLoc: Foot     Well developed with normal mood.  On exam there is minimal tenderness to palpation but mild fullness over the proximal lateral aspect of the fifth metatarsal.  She had good eversion strength and was nontender along the distal fifth metatarsal in the area of previous fracture.  Neutral dorsiflexion of the heel cord      Radiology: 3 views left foot ordered evaluate pain and alignment reviewed and previous x-rays.  Previous fifth metatarsal fracture appears to remain healed hardware remains intact.  I do not see any obvious stress fractures.  There is no obvious fracture at the fifth metatarsal base.      Assessment/Plan: Left fifth metatarsal bursitis with possible insertional peroneal tendinosis.    2.  Previous distal left fifth metatarsal fracture status post ORIF    We discussed treatment going forward and nothing I would recommend as far as further workup or surgical treatment at this time.    I had her fit with a lateral heel wedge.  " We also had Leti work on fashioning a dancers pad to offload this area.  She will continue with wider accommodative shoes that she has been doing.    She was instructed on heel cord stretching exercises to do 3-4 times a day.  Instruction sheet was provided and demonstrated for her.    Also prescribed compounding cream to apply several times daily.    Will plan to see her back in 6 to 8 weeks but if not making any improvement 3 weeks prior to follow-up appointment she will let us know and we will get an MRI of her left hindfoot midfoot area.

## 2024-12-06 PROBLEM — M77.52 BURSITIS OF LEFT FOOT: Status: ACTIVE | Noted: 2024-12-06

## 2024-12-06 PROBLEM — M77.42 METATARSALGIA OF LEFT FOOT: Status: ACTIVE | Noted: 2024-12-06

## 2024-12-06 NOTE — PROGRESS NOTES
"Foot Follow Up      Patient: Linda Marrufo    YOB: 1980 44 y.o. female    Chief Complaints: Foot is maybe \"some better\"    History of Present Illness:Patient follows up from ORIF of comminuted oblique distal fifth metatarsal fracture with plating and DBM bone grafting on 12/20/2022.     She seen on 1/4/2023 reporting that she was doing well and really not having any appreciable pain but did still feel she has some numbness in her leg and foot residual from her block.  She d did feel like the numbness was improving.  Current pain was rated 0 out of 10.     We discussed treatment options and there was no sign of RSD nor infection and was having numbness outside the areas of surgery so I felt it was residual from her block.     Sutures removed and Steri-Strips were applied and she was placed back into a forefoot and ankle dressing with instructions to continue with elevation and nonweightbearing     She was seen on 1/19/2023 stating that she felt fine.    She still had numbness and was then getting little bit of tingling in her lateral leg and foot but did feel like it is improving.  She was not having any neuropathic pain.     At that time her seem to be healing well and she denied any sign of infection or RSD.  She was left out of her wrapping and instructed on wound care and to continue nonweightbearing for another 10 days and then progress to partial foot flat weightbearing postoperative shoe and crutches.  I was encouraged that her neuritic symptoms of numbness were improving and that the tingling may be some return of nerve sensation but did not see anything as far as RSD or different to do at that time.     She i was seen on 2/9/2023 stating that her foot felt good.  The sensation was returning to her leg with still a bit diminished sensation laterally but no further tingling.  Pain was rated 0 out of 10.     I was encouraged that symptoms are improving and she would like to progress to 50% " weightbearing with 2 crutches and postop shoe for a week and 1 crutch for a week and then to just her postop shoe.  She is anxious to return to work and I would not allow her to use crutches but she could use a knee scooter and instructed to do that until she could go to work with just her postop shoe.     She was seen on 3/2/2023 reporting that she was feeling good.  She had been off her crutches for about a week with no complaints of pain.  She had not been allowed to return to work (per her company) while still in a postop shoe.     She was instructed to continue with postop shoe for another 10 days and then start gradual working to a stiff sturdy hiking shoe initially around the house and then out and returned to work in such.     She seen on 3/30/2023 stating that she was not having any pain along the fifth metatarsal but got some intermittent aching pain and swelling in the left forefoot.  This had been going on for about a week and was only intermittent.     We discussed treatment going forward and did not see any obvious sign of stress fracture or instability to the MTP joints and felt this could have been after some weightbearing after she did limited in her postoperative shoe.  She was instructed continue with stiff accommodative shoes such as stiff tennis shoes or hiking shoes and if she had unrelenting pain to get into her postoperative shoe.  She was instructed on heel cord stretching exercises and use of Voltaren gel and let me know if symptoms were not improving so we could get an MRI     Patient was seen on 5/11/2023 stating that her foot felt great.  She had used the Voltaren gel and was doing he heel cord stretching.  Symptoms in the forefoot had resolved and she was not having any pain in the fifth metatarsal.  Pain was rated 0 out of 10.     At that time she seemed to be doing well and did not show any sign of current problems or infection.  She would like advance activities as tolerated and be  seen back as needed.     Patient was seen on 10/21/2024 reporting onset of swelling and intermittent discomfort at the base of her fifth metatarsal.  She denied having specific pain at the fracture site..  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  She did get some pain in the morning.  She had had some fullness at the base of the fifth metatarsal.  She continued to play volleyball.  Current pain was rated at 0 out of 10.    She is felt to have some metatarsal bursitis with possible insertional peroneal tendinosis with previous distal fifth metatarsal ORIF.  Nothing was recommended from a surgical standpoint.  She was fitted with a lateral heel wedge insulin is fashioned a dancers pad to offload the area and she was instructed to use wider accommodative shoes.  She is instructed on heel cord stretching exercises and prescribed compounding cream.  She was to let us know within 3 weeks of her follow-up appointment if she was not getting better so we could get an MRI prior to follow-up.    Patient is seen back today reporting that she is maybe some better.  Still has pain and swelling in the inferolateral aspect of the left midfoot into the fifth metatarsal and at its insertion.  Pain is worse after she does a lot of working standing on concrete.  She reports that she previously had this pain before she had the surgery on her fifth metatarsal but only when running has gotten worse after she had operative treatment and it is hard for her to walk barefoot.  She has been using a lateral heel wedge and been using the dancers pad but feels that she needs a bigger 1 and has ordered some.  She is only been heel cord stretching about twice a day.  She is only intermittently used compounding cream.  HPI    ROS: Foot pain  Past Medical History:   Diagnosis Date    Fracture, foot 11.27    surgery 12.20.22    HA (headache)     History of  "weight gain     Hodgkin disease     RELAPSED    Hypothyroidism     Lymphoma     Ovarian failure due to cancer therapy     Peripheral neuropathy due to chemotherapy      Physical Exam:   Vitals:    12/09/24 1023   Temp: 98.3 °F (36.8 °C)   Weight: 101 kg (221 lb 9.6 oz)   Height: 180.3 cm (71\")   PainSc:   4   PainLoc: Foot     Well developed with normal mood.  On exam her left foot incision remains well-healed.  There is an area of fullness with tenderness to palpation of the plantar lateral aspect of the fifth metatarsal proximally with some discomfort at the insertion of the peroneal tendon able to actively crescencio well.      Radiology: None performed      Assessment/Plan:1  Left fifth metatarsal bursitis with possible insertional peroneal tendinosis.     2.  Previous distal left fifth metatarsal fracture status post ORIF    We discussed treatment going forward and she will continue with a lateral heel wedge and she can work on getting the Baker offloading pad for this.    Recommend she increase her heel cord stretching to at least 3-4 times a day and use compounding cream more regularly.    When to send her for MRI of her left hindfoot and midfoot including this area to evaluate for partial or mass formation as well as to make sure there is no interstitial insertional tears of the peroneus brevis tendon.    Will see her back in 4 weeks to assess progress review MRI and determine treatment course going forward.  "

## 2024-12-09 ENCOUNTER — OFFICE VISIT (OUTPATIENT)
Dept: ORTHOPEDIC SURGERY | Facility: CLINIC | Age: 44
End: 2024-12-09
Payer: COMMERCIAL

## 2024-12-09 VITALS — WEIGHT: 221.6 LBS | TEMPERATURE: 98.3 F | HEIGHT: 71 IN | BODY MASS INDEX: 31.02 KG/M2

## 2024-12-09 DIAGNOSIS — M77.52 BURSITIS OF LEFT FOOT: Primary | ICD-10-CM

## 2024-12-09 DIAGNOSIS — M76.72 PERONEAL TENDONITIS, LEFT: ICD-10-CM

## 2024-12-09 DIAGNOSIS — M77.42 METATARSALGIA OF LEFT FOOT: ICD-10-CM

## 2024-12-09 PROCEDURE — 99213 OFFICE O/P EST LOW 20 MIN: CPT | Performed by: ORTHOPAEDIC SURGERY

## 2025-01-15 ENCOUNTER — HOSPITAL ENCOUNTER (OUTPATIENT)
Dept: MRI IMAGING | Facility: HOSPITAL | Age: 45
Discharge: HOME | End: 2025-01-15
Admitting: ORTHOPAEDIC SURGERY
Payer: COMMERCIAL

## 2025-01-15 DIAGNOSIS — M76.72 PERONEAL TENDONITIS, LEFT: ICD-10-CM

## 2025-01-15 DIAGNOSIS — M77.42 METATARSALGIA OF LEFT FOOT: ICD-10-CM

## 2025-01-15 DIAGNOSIS — M77.52 BURSITIS OF LEFT FOOT: ICD-10-CM

## 2025-01-15 PROCEDURE — A9577 INJ MULTIHANCE: HCPCS | Performed by: ORTHOPAEDIC SURGERY

## 2025-01-15 PROCEDURE — 25510000002 GADOBENATE DIMEGLUMINE 529 MG/ML SOLUTION: Performed by: ORTHOPAEDIC SURGERY

## 2025-01-15 PROCEDURE — 73723 MRI JOINT LWR EXTR W/O&W/DYE: CPT

## 2025-01-15 RX ADMIN — GADOBENATE DIMEGLUMINE 20 ML: 529 INJECTION, SOLUTION INTRAVENOUS at 08:45

## 2025-01-24 PROBLEM — M76.72 PERONEAL TENDONITIS, LEFT: Status: ACTIVE | Noted: 2025-01-24

## 2025-01-24 PROBLEM — S93.402A SPRAIN OF LEFT ANKLE: Status: ACTIVE | Noted: 2025-01-24

## 2025-01-24 PROBLEM — M19.072 ARTHRITIS OF LEFT SUBTALAR JOINT: Status: ACTIVE | Noted: 2025-01-24

## 2025-01-24 PROBLEM — M79.3: Status: ACTIVE | Noted: 2025-01-24

## 2025-01-24 NOTE — PROGRESS NOTES
"Foot Follow Up      Patient: Linda Marrufo    YOB: 1980 44 y.o. female    Chief Complaints: Foot \"feels okay\"    History of Present Illness:Patient follows up from ORIF of comminuted oblique distal fifth metatarsal fracture with plating and DBM bone grafting on 12/20/2022.     She seen on 1/4/2023 reporting that she was doing well and really not having any appreciable pain but did still feel she has some numbness in her leg and foot residual from her block.  She d did feel like the numbness was improving.  Current pain was rated 0 out of 10.     We discussed treatment options and there was no sign of RSD nor infection and was having numbness outside the areas of surgery so I felt it was residual from her block.     Sutures removed and Steri-Strips were applied and she was placed back into a forefoot and ankle dressing with instructions to continue with elevation and nonweightbearing     She was seen on 1/19/2023 stating that she felt fine.    She still had numbness and was then getting little bit of tingling in her lateral leg and foot but did feel like it is improving.  She was not having any neuropathic pain.     At that time her seem to be healing well and she denied any sign of infection or RSD.  She was left out of her wrapping and instructed on wound care and to continue nonweightbearing for another 10 days and then progress to partial foot flat weightbearing postoperative shoe and crutches.  I was encouraged that her neuritic symptoms of numbness were improving and that the tingling may be some return of nerve sensation but did not see anything as far as RSD or different to do at that time.     She i was seen on 2/9/2023 stating that her foot felt good.  The sensation was returning to her leg with still a bit diminished sensation laterally but no further tingling.  Pain was rated 0 out of 10.     I was encouraged that symptoms are improving and she would like to progress to 50% " weightbearing with 2 crutches and postop shoe for a week and 1 crutch for a week and then to just her postop shoe.  She is anxious to return to work and I would not allow her to use crutches but she could use a knee scooter and instructed to do that until she could go to work with just her postop shoe.     She was seen on 3/2/2023 reporting that she was feeling good.  She had been off her crutches for about a week with no complaints of pain.  She had not been allowed to return to work (per her company) while still in a postop shoe.     She was instructed to continue with postop shoe for another 10 days and then start gradual working to a stiff sturdy hiking shoe initially around the house and then out and returned to work in such.     She seen on 3/30/2023 stating that she was not having any pain along the fifth metatarsal but got some intermittent aching pain and swelling in the left forefoot.  This had been going on for about a week and was only intermittent.     We discussed treatment going forward and did not see any obvious sign of stress fracture or instability to the MTP joints and felt this could have been after some weightbearing after she did limited in her postoperative shoe.  She was instructed continue with stiff accommodative shoes such as stiff tennis shoes or hiking shoes and if she had unrelenting pain to get into her postoperative shoe.  She was instructed on heel cord stretching exercises and use of Voltaren gel and let me know if symptoms were not improving so we could get an MRI     Patient was seen on 5/11/2023 stating that her foot felt great.  She had used the Voltaren gel and was doing he heel cord stretching.  Symptoms in the forefoot had resolved and she was not having any pain in the fifth metatarsal.  Pain was rated 0 out of 10.     At that time she seemed to be doing well and did not show any sign of current problems or infection.  She would like advance activities as tolerated and be  seen back as needed.     Patient was seen on 10/21/2024 reporting onset of swelling and intermittent discomfort at the base of her fifth metatarsal.  She denied having specific pain at the fracture site..  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  She did get some pain in the morning.  She had had some fullness at the base of the fifth metatarsal.  She continued to play volleyball.  Current pain was rated at 0 out of 10.     She is felt to have some metatarsal bursitis with possible insertional peroneal tendinosis with previous distal fifth metatarsal ORIF.  Nothing was recommended from a surgical standpoint.  She was fitted with a lateral heel wedge insulin is fashioned a dancers pad to offload the area and she was instructed to use wider accommodative shoes.  She is instructed on heel cord stretching exercises and prescribed compounding cream.  She was to let us know within 3 weeks of her follow-up appointment if she was not getting better so we could get an MRI prior to follow-up.     Patient was seen on 12/9/2024 reporting that she was maybe some better.  Still had pain and swelling in the inferolateral aspect of the left midfoot into the fifth metatarsal and at its insertion.  Pain was worse after she does a lot of working standing on concrete.  She reported that she previously had this pain before she had the surgery on her fifth metatarsal but only when running and gotten worse after she had operative treatment and it was hard for her to walk barefoot.  She had been using a lateral heel wedge and been using the dancers pad but felt felt that she needs a bigger 1 and had ordered some.  She had only been heel cord stretching about twice a day.  She had only intermittently used compounding cream.    We discussed treatment which she was going to continue with lateral heel wedge and work on getting a better offloading pad for  "this recommend she increase heel cord stretching and use compounding cream more regularly.  She was sent for MRI for further evaluation patient was no mass or insertional tear of the peroneus brevis tendon.    Patient is seen back today reporting that her foot \"feels okay\".  She is gotten a different job where she is not walking 12 hours a day.  She reports compounding cream has helped.  She had to get some new dancers pads as able to get wet in the snow.  She had been heel cord stretching at least twice a day and sometimes more if is bothering her.  Overall pain has improved in the lateral aspect of the left midfoot along fifth metatarsal does bother her some with barefoot walking.    She is not having any lateral pain or instability and no tenderness in the foot otherwise.  Current pain is rated at 0 out of 10  HPI    ROS: Foot pain  Past Medical History:   Diagnosis Date    Fracture, foot 11.27    surgery 12.20.22    HA (headache)     History of weight gain     Hodgkin disease     RELAPSED    Hypothyroidism     Lymphoma     Ovarian failure due to cancer therapy     Peripheral neuropathy due to chemotherapy     Sprain of left ankle 1/24/2025     Physical Exam:   Vitals:    01/27/25 0812   Temp: 97.5 °F (36.4 °C)   Weight: 101 kg (222 lb)   Height: 180.3 cm (71\")   PainSc: 0-No pain     Well developed with normal mood.  Exam there is some slight fatty fullness over the plantar lateral aspect of the fifth metatarsal without tenderness palpation good eversion strength.  Grossly stable anterior drawer and nontender with dorsum of the midfoot nor along the distal fifth metatarsal.      Radiology: MRI films and report of the left hindfoot including the midfoot and proximal forefoot from 1/15/2025 reviewed which shows abnormal enlargement of the peroneus longus tendon posterior to lateral malleolus.  Brevis is somewhat flattened and contains increased T2 increased signal.  There is thickening of the peroneus brevis " tendon which is displaced medially and posterior to the lateral malleolus but no intrinsic fluid signal or evidence of tear    At the area of the gelcap at the base of fifth metatarsal there was underlying thickened edematous fat without evidence of bursal collection or soft tissue nodule.  There was evidence of previous ORIF and fracture appear to be healed although there was some artifact.    There is attenuation of the ATFL with previous lateral ankle sprain.    Subchondral marrow edema within the plantar talus abutting the posterior facet subtalar joint associated with articular cartilage thinning with thickening of the dorsal talonavicular ligament.      Assessment/Plan: 1.  Edematous mildly swollen fat with localized inflammation at the base of the fifth metatarsal without clear evidence of bursal formation or nodule  2.  Left peroneus brevis and longus tendinopathy with enlargement of the peroneus longus and flattening of the peroneus brevis which was somewhat displaced medially but no evidence of tear  3.  Left previous lateral ankle sprain with attenuation of the ATFL and thickening of the dorsal talonavicular ligament  4.  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  Previous left distal fifth metatarsal fracture status post ORIF    We discussed treatment going forward overall seems to be doing well and would not recommend any surgical treatment as is not really anything that I would feel would be reliable for her and symptoms have improved.    She is not having any lateral ankle pain or instability but has had some sprains in the past but nothing currently symptomatic.    We discussed custom orthotics to offload the lateral foot which she wants to hold off on and she will use over-the-counter pads.    Recommend she continue with heel cord stretching as well as with compounding cream as needed and if she has persistent worsening symptoms I will  see her back otherwise by mutual agreement I will see her back as needed

## 2025-01-27 ENCOUNTER — OFFICE VISIT (OUTPATIENT)
Dept: ORTHOPEDIC SURGERY | Facility: CLINIC | Age: 45
End: 2025-01-27
Payer: COMMERCIAL

## 2025-01-27 VITALS — BODY MASS INDEX: 31.08 KG/M2 | WEIGHT: 222 LBS | TEMPERATURE: 97.5 F | HEIGHT: 71 IN

## 2025-01-27 DIAGNOSIS — M19.072 ARTHRITIS OF LEFT SUBTALAR JOINT: ICD-10-CM

## 2025-01-27 DIAGNOSIS — M76.72 PERONEAL TENDONITIS, LEFT: Primary | ICD-10-CM

## 2025-01-27 DIAGNOSIS — M79.3: ICD-10-CM

## 2025-01-27 DIAGNOSIS — S93.402S SPRAIN OF LEFT ANKLE, UNSPECIFIED LIGAMENT, SEQUELA: ICD-10-CM

## 2025-01-27 PROCEDURE — 99213 OFFICE O/P EST LOW 20 MIN: CPT | Performed by: ORTHOPAEDIC SURGERY

## 2025-02-25 ENCOUNTER — PATIENT MESSAGE (OUTPATIENT)
Dept: INTERNAL MEDICINE | Facility: CLINIC | Age: 45
End: 2025-02-25
Payer: COMMERCIAL

## 2025-02-25 DIAGNOSIS — R11.0 NAUSEA: Primary | ICD-10-CM

## 2025-02-25 RX ORDER — ONDANSETRON 4 MG/1
4 TABLET, ORALLY DISINTEGRATING ORAL EVERY 8 HOURS PRN
Qty: 30 TABLET | Refills: 0 | Status: SHIPPED | OUTPATIENT
Start: 2025-02-25

## 2025-02-26 DIAGNOSIS — E03.9 HYPOTHYROIDISM, ACQUIRED: ICD-10-CM

## 2025-02-26 RX ORDER — LEVOTHYROXINE SODIUM 100 UG/1
100 TABLET ORAL
Qty: 90 TABLET | Refills: 0 | Status: SHIPPED | OUTPATIENT
Start: 2025-02-26

## 2025-04-11 ENCOUNTER — OFFICE VISIT (OUTPATIENT)
Dept: INTERNAL MEDICINE | Facility: CLINIC | Age: 45
End: 2025-04-11
Payer: COMMERCIAL

## 2025-04-11 VITALS
TEMPERATURE: 97.7 F | DIASTOLIC BLOOD PRESSURE: 72 MMHG | OXYGEN SATURATION: 99 % | BODY MASS INDEX: 30.28 KG/M2 | HEART RATE: 77 BPM | WEIGHT: 223.6 LBS | HEIGHT: 72 IN | SYSTOLIC BLOOD PRESSURE: 105 MMHG

## 2025-04-11 DIAGNOSIS — Z12.11 SCREENING FOR COLON CANCER: ICD-10-CM

## 2025-04-11 DIAGNOSIS — Z00.00 ANNUAL PHYSICAL EXAM: ICD-10-CM

## 2025-04-11 DIAGNOSIS — E03.9 HYPOTHYROIDISM, ACQUIRED: Primary | ICD-10-CM

## 2025-04-11 NOTE — PROGRESS NOTES
"Chief Complaint  Annual Exam    Subjective        Linda Marrufo presents to [facility]  History of Present Illness  45-year-old female presenting for annual exam.  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  Recently lost a crown and has been unable to establish with a dentist in the area quickly.    Exercises every morning including spin bike.      Taking levothyroxine as prescribed.    Denies chest pain, difficulty breathing, swelling of hands or feet, constipation, dysuria and changes in vision or hearing.        Objective   Vital Signs:  /72 (BP Location: Left arm, Patient Position: Sitting, Cuff Size: Large Adult)   Pulse 77   Temp 97.7 °F (36.5 °C) (Temporal)   Ht 182.9 cm (72.01\")   Wt 101 kg (223 lb 9.6 oz)   SpO2 99%   BMI 30.32 kg/m²   Estimated body mass index is 30.32 kg/m² as calculated from the following:    Height as of this encounter: 182.9 cm (72.01\").    Weight as of this encounter: 101 kg (223 lb 9.6 oz).               Physical Exam  Vitals reviewed.   Constitutional:       Appearance: Normal appearance.   HENT:      Head: Normocephalic.      Right Ear: Tympanic membrane normal.      Left Ear: Tympanic membrane normal.      Nose: Nose normal.      Mouth/Throat:      Mouth: Mucous membranes are moist.      Pharynx: Oropharynx is clear.   Eyes:      Pupils: Pupils are equal, round, and reactive to light.   Cardiovascular:      Rate and Rhythm: Normal rate.      Pulses: Normal pulses.      Heart sounds: Normal heart sounds.   Pulmonary:      Effort: Pulmonary effort is normal.      Breath sounds: Normal breath sounds.   Abdominal:      General: Bowel sounds are normal.      Palpations: Abdomen is soft.   Musculoskeletal:         General: Normal range of motion.      Cervical back: Normal range of motion.   Skin:     General: Skin is warm and dry.      Capillary Refill: Capillary refill takes less than 2 seconds. "   Neurological:      General: No focal deficit present.      Mental Status: She is alert and oriented to person, place, and time.   Psychiatric:         Mood and Affect: Mood normal.         Behavior: Behavior normal.         Thought Content: Thought content normal.         Judgment: Judgment normal.        Result Review :            Current Outpatient Medications on File Prior to Visit   Medication Sig Dispense Refill    Diclofenac Sodium 4 %, Topiramate 2 %, cloNIDine HCl 0.2 %, Lidocaine HCl 5 % Apply 1-2 g topically to the appropriate area as directed 3 (Three) to 4 (Four) times daily. 90 g 1    Elderberry 500 MG capsule Take  by mouth.      levothyroxine (SYNTHROID, LEVOTHROID) 100 MCG tablet TAKE 1 TABLET BY MOUTH EVERY MORNING BEFORE BREAKFAST 90 tablet 0    ondansetron ODT (ZOFRAN-ODT) 4 MG disintegrating tablet Place 1 tablet on the tongue Every 8 (Eight) Hours As Needed for Nausea or Vomiting. 30 tablet 0    promethazine (PHENERGAN) 12.5 MG tablet Take 1 tablet by mouth Every 12 (Twelve) Hours As Needed for Nausea. 12 tablet 0    vitamin D3 125 MCG (5000 UT) capsule capsule Take 1 capsule by mouth Daily.       No current facility-administered medications on file prior to visit.                Assessment and Plan     Diagnoses and all orders for this visit:    1. Hypothyroidism, acquired (Primary)    2. Annual physical exam  -     Urinalysis With Microscopic If Indicated (No Culture) - Urine, Clean Catch; Future  -     Lipid Panel With / Chol / HDL Ratio; Future    3. Screening for colon cancer  -     Cologuard - Stool, Per Rectum; Future        Patient Instructions   Continue medications as prescribed. Stay active. Stay hydrated with water. Consider food journal. Cologuard to be completed as instructed. Labs in the future. Follow-up in 6 months for recheck.     The patient was counseled regarding nutrition, physical activity, healthy weight, injury prevention, misuse of tobacco, alcohol and illicit drugs,  mental health, immunizations, and screenings.         Follow Up     Return in about 6 months (around 10/11/2025) for Recheck.  Patient was given instructions and counseling regarding her condition or for health maintenance advice. Please see specific information pulled into the AVS if appropriate.

## 2025-04-11 NOTE — PATIENT INSTRUCTIONS
Continue medications as prescribed. Stay active. Stay hydrated with water. Consider food journal. Cologuard to be completed as instructed. Labs in the future. Follow-up in 6 months for recheck.

## 2025-04-21 ENCOUNTER — TELEPHONE (OUTPATIENT)
Dept: ONCOLOGY | Facility: CLINIC | Age: 45
End: 2025-04-21
Payer: COMMERCIAL

## 2025-04-21 NOTE — TELEPHONE ENCOUNTER
"  Caller: Linda Marrufo \"Kayla\"    Relationship: Self    Best call back number: [phone]    What is the best time to reach you: CLINICAL    Who are you requesting to speak with (clinical staff, provider,  specific staff member): CLINICAL    What was the call regarding: PT IS SCHEDULED FOR LABS ON 4-23 AND HAS A URINALYSIS AND LIPID PANEL ORDERED BY DR AUGUSTIN, SHE IS WANTING TO KNOW IF SHE CAN HAVE THESE DOES DURING HER LAB APPT.    PLEASE ADVISE        "

## 2025-04-21 NOTE — TELEPHONE ENCOUNTER
Returned call to Kayla, let her know we can draw the labs from her PCP.  She requested an earlier appt if available- review of Dr Palacios's schedule appears to be an opening at noon.  Message to scheduling to move up if available and notify her.

## 2025-04-23 ENCOUNTER — LAB (OUTPATIENT)
Dept: LAB | Facility: HOSPITAL | Age: 45
End: 2025-04-23
Payer: COMMERCIAL

## 2025-04-23 ENCOUNTER — OFFICE VISIT (OUTPATIENT)
Dept: ONCOLOGY | Facility: CLINIC | Age: 45
End: 2025-04-23
Payer: COMMERCIAL

## 2025-04-23 VITALS
SYSTOLIC BLOOD PRESSURE: 97 MMHG | WEIGHT: 222.7 LBS | OXYGEN SATURATION: 100 % | TEMPERATURE: 97.6 F | HEART RATE: 66 BPM | HEIGHT: 72 IN | DIASTOLIC BLOOD PRESSURE: 67 MMHG | BODY MASS INDEX: 30.16 KG/M2

## 2025-04-23 DIAGNOSIS — C81.21 MIXED CELLULARITY HODGKIN LYMPHOMA OF LYMPH NODES OF NECK: ICD-10-CM

## 2025-04-23 DIAGNOSIS — E03.9 HYPOTHYROIDISM, ACQUIRED: Primary | ICD-10-CM

## 2025-04-23 DIAGNOSIS — E03.9 HYPOTHYROIDISM, ACQUIRED: ICD-10-CM

## 2025-04-23 LAB
ALBUMIN SERPL-MCNC: 4.1 G/DL (ref 3.5–5.2)
ALBUMIN/GLOB SERPL: 1.8 G/DL
ALP SERPL-CCNC: 58 U/L (ref 39–117)
ALT SERPL W P-5'-P-CCNC: 28 U/L (ref 1–33)
ANION GAP SERPL CALCULATED.3IONS-SCNC: 14.2 MMOL/L (ref 5–15)
AST SERPL-CCNC: 28 U/L (ref 1–32)
BASOPHILS # BLD AUTO: 0.03 10*3/MM3 (ref 0–0.2)
BASOPHILS NFR BLD AUTO: 0.5 % (ref 0–1.5)
BILIRUB SERPL-MCNC: 0.4 MG/DL (ref 0–1.2)
BUN SERPL-MCNC: 10 MG/DL (ref 6–20)
BUN/CREAT SERPL: 11.4 (ref 7–25)
CALCIUM SPEC-SCNC: 9.3 MG/DL (ref 8.6–10.5)
CHLORIDE SERPL-SCNC: 104 MMOL/L (ref 98–107)
CO2 SERPL-SCNC: 22.8 MMOL/L (ref 22–29)
CREAT SERPL-MCNC: 0.88 MG/DL (ref 0.57–1)
DEPRECATED RDW RBC AUTO: 40.6 FL (ref 37–54)
EGFRCR SERPLBLD CKD-EPI 2021: 82.7 ML/MIN/1.73
EOSINOPHIL # BLD AUTO: 0.05 10*3/MM3 (ref 0–0.4)
EOSINOPHIL NFR BLD AUTO: 0.9 % (ref 0.3–6.2)
ERYTHROCYTE [DISTWIDTH] IN BLOOD BY AUTOMATED COUNT: 12.4 % (ref 12.3–15.4)
GLOBULIN UR ELPH-MCNC: 2.3 GM/DL
GLUCOSE SERPL-MCNC: 93 MG/DL (ref 65–99)
HCT VFR BLD AUTO: 40.1 % (ref 34–46.6)
HGB BLD-MCNC: 13.3 G/DL (ref 12–15.9)
IMM GRANULOCYTES # BLD AUTO: 0.02 10*3/MM3 (ref 0–0.05)
IMM GRANULOCYTES NFR BLD AUTO: 0.4 % (ref 0–0.5)
LYMPHOCYTES # BLD AUTO: 1.82 10*3/MM3 (ref 0.7–3.1)
LYMPHOCYTES NFR BLD AUTO: 32.7 % (ref 19.6–45.3)
MCH RBC QN AUTO: 29.7 PG (ref 26.6–33)
MCHC RBC AUTO-ENTMCNC: 33.2 G/DL (ref 31.5–35.7)
MCV RBC AUTO: 89.5 FL (ref 79–97)
MONOCYTES # BLD AUTO: 0.43 10*3/MM3 (ref 0.1–0.9)
MONOCYTES NFR BLD AUTO: 7.7 % (ref 5–12)
NEUTROPHILS NFR BLD AUTO: 3.22 10*3/MM3 (ref 1.7–7)
NEUTROPHILS NFR BLD AUTO: 57.8 % (ref 42.7–76)
NRBC BLD AUTO-RTO: 0 /100 WBC (ref 0–0.2)
PLATELET # BLD AUTO: 234 10*3/MM3 (ref 140–450)
PMV BLD AUTO: 8.1 FL (ref 6–12)
POTASSIUM SERPL-SCNC: 4.1 MMOL/L (ref 3.5–5.2)
PROT SERPL-MCNC: 6.4 G/DL (ref 6–8.5)
RBC # BLD AUTO: 4.48 10*6/MM3 (ref 3.77–5.28)
SODIUM SERPL-SCNC: 141 MMOL/L (ref 136–145)
T4 FREE SERPL-MCNC: 0.98 NG/DL (ref 0.92–1.68)
TSH SERPL DL<=0.05 MIU/L-ACNC: 2.45 UIU/ML (ref 0.27–4.2)
WBC NRBC COR # BLD AUTO: 5.57 10*3/MM3 (ref 3.4–10.8)

## 2025-04-23 PROCEDURE — 36415 COLL VENOUS BLD VENIPUNCTURE: CPT

## 2025-04-23 PROCEDURE — 84439 ASSAY OF FREE THYROXINE: CPT | Performed by: INTERNAL MEDICINE

## 2025-04-23 PROCEDURE — 80050 GENERAL HEALTH PANEL: CPT

## 2025-04-23 NOTE — PROGRESS NOTES
Subjective   REASONS FOR FOLLOWUP:     Relapsed Hodgkin disease, now status post high dose therapy and autologous stem cell rescue at [place] on 01/28/2011.   Radiation therapy to the left neck and supraclavicular area at [facility] with Dr. Wilkerson completed 04/28/2011.   Ovarian failure following chemotherapy currently on hormone replacement and following up with Dr. Concetta Duncan, OB-GYN.   Hypothyroidism on Synthroid replacement.     HISTORY OF PRESENT ILLNESS:   Linda is a 45 y.o. female with the above-noted history of recurrent Hodgkin disease treated with high-dose therapy and stem cell transplant at [facility] in January 2011. Following her transplant, she also received radiation therapy to the left neck and supraclavicular area which she completed in April 2011.    She presents today for an annual follow-up.  She is up-to-date on her screening mammograms.  A Cologuard has been provided to the patient by her primary care physician but she has not been able to turn it in.  She has never had a colonoscopy.    CBC from 4/23/2025 reviewed and within normal limits.    CMP reviewed and within normal limits.  TSH normal at 2.45, T4 normal at 0.98, lipid panel and urinalysis pending.    She denies any new skin lesions or breast masses.    Overall doing well with no new complaints.    She does not see a dermatologist regularly.       History of Present Illness      Past Medical History, Past Surgical History, Social History, Family History have been reviewed and are without significant changes except as mentioned.    Review of Systems   Constitutional:  Negative for activity change, appetite change, fatigue, fever and unexpected weight change.   HENT:  Negative for hearing loss, nosebleeds, trouble swallowing and voice change.    Eyes:  Negative for visual disturbance.   Respiratory:  Negative for cough, shortness of breath and wheezing.    Cardiovascular:  Negative  "for chest pain and palpitations.   Gastrointestinal:  Negative for abdominal pain, diarrhea, nausea and vomiting.   Genitourinary:  Negative for difficulty urinating, frequency, hematuria and urgency.   Musculoskeletal:  Negative for back pain and neck pain.   Skin:  Negative for rash.   Neurological:  Negative for dizziness, seizures, syncope and headaches.   Hematological:  Negative for adenopathy. Does not bruise/bleed easily.   Psychiatric/Behavioral:  Negative for behavioral problems. The patient is not nervous/anxious.       A comprehensive 14 point review of systems was performed and was negative except as mentioned.    Medications:  The current medication list was reviewed in the EMR    ALLERGIES:  No Known Allergies    Objective      Vitals:    04/23/25 1200   BP: 97/67   Pulse: 66   Temp: 97.6 °F (36.4 °C)   TempSrc: Oral   SpO2: 100%   Weight: 101 kg (222 lb 11.2 oz)   Height: 182.9 cm (72.01\")   PainSc: 0-No pain         4/23/2025    12:00 PM   Current Status   ECOG score 0       Physical Exam   Constitutional: She is oriented to person, place, and time. She appears well-developed. No distress.   HENT:   Head: Normocephalic.   Eyes: Pupils are equal, round, and reactive to light. Conjunctivae are normal. No scleral icterus.   Neck: No JVD present. No thyromegaly present.   Cardiovascular: Normal rate and regular rhythm. Exam reveals no gallop and no friction rub.   No murmur heard.  Pulmonary/Chest: Effort normal and breath sounds normal. She has no wheezes. She has no rales.   Abdominal: Soft. She exhibits no distension and no mass. There is no abdominal tenderness.   Musculoskeletal: Normal range of motion. No deformity.   Lymphadenopathy:     She has no cervical adenopathy.   Neurological: She is alert and oriented to person, place, and time. She has normal reflexes. No cranial nerve deficit.   Skin: Skin is warm and dry. No rash noted. No erythema.   Psychiatric: Her behavior is normal. Judgment " normal.       I have reexamined the patient and the results are consistent with the previously documented exam. Marisa Palacios MD      RECENT LABS:  Hematology WBC   Date Value Ref Range Status   04/23/2025 5.57 3.40 - 10.80 10*3/mm3 Final     RBC   Date Value Ref Range Status   04/23/2025 4.48 3.77 - 5.28 10*6/mm3 Final     Hemoglobin   Date Value Ref Range Status   04/23/2025 13.3 12.0 - 15.9 g/dL Final     Hematocrit   Date Value Ref Range Status   04/23/2025 40.1 34.0 - 46.6 % Final     Platelets   Date Value Ref Range Status   04/23/2025 234 140 - 450 10*3/mm3 Final            Lab Results   Component Value Date    TSH 1.350 04/03/2024       Assessment & Plan   ASSESSMENT:   1. Hodgkin disease in remission following high dose therapy and autologous stem cell transplant at [place] in late January 2011. The patient continues to do well with no evidence of disease, now over 14 years out from her transplant.   2. Post-transplant consolidation radiation left neck and supraclavicular area completed 04/28/2011.   3. Ovarian failure following high-dose chemotherapy. The patient continues to followup with her OB/GYN, Dr. Concetta Duncan.  She was briefly on hormone replacement therapy for about 3 months but discontinued as she felt like it was not needed for symptom management at this time.                  4. Hypothyroidism, currently on Synthroid replacement 100 mcg daily.  TSH and T4 normal.          PLAN:   She will require high risk screening for breast cancer including a screening MRI as well as annual mammogram.  This is due to the fact that she had chest irradiation.  Since her initial chest irradiation was in 2010 she started breast imaging in 2019 when she was 38.  Thyroid function within normal limits.  Continue Synthroid  She would benefit from an annual dermatological exam.  We discussed about the increased risk of secondary cancers after successful treatment of Hodgkin's lymphoma.  No evidence of  myelodysplasia or bone marrow abnormalities based on the CBC today.  Will continue to follow-up annually.    Patient is new to me and all issues are new to me today.  42 minutes total spent on the encounter on the same day.              4/23/2025      CC:

## 2025-04-24 ENCOUNTER — PATIENT MESSAGE (OUTPATIENT)
Dept: INTERNAL MEDICINE | Facility: CLINIC | Age: 45
End: 2025-04-24
Payer: COMMERCIAL

## 2025-07-25 RX ORDER — ONDANSETRON 4 MG/1
4 TABLET, FILM COATED ORAL EVERY 8 HOURS PRN
Qty: 30 TABLET | Refills: 2 | Status: SHIPPED | OUTPATIENT
Start: 2025-07-25

## 2025-07-29 DIAGNOSIS — R11.0 NAUSEA: Primary | ICD-10-CM

## 2025-07-29 RX ORDER — ONDANSETRON 4 MG/1
4 TABLET, ORALLY DISINTEGRATING ORAL EVERY 8 HOURS PRN
Qty: 18 TABLET | Refills: 2 | Status: SHIPPED | OUTPATIENT
Start: 2025-07-29

## 2025-08-13 ENCOUNTER — OFFICE VISIT (OUTPATIENT)
Dept: OBSTETRICS AND GYNECOLOGY | Age: 45
End: 2025-08-13
Payer: COMMERCIAL

## 2025-08-13 VITALS
HEIGHT: 72 IN | SYSTOLIC BLOOD PRESSURE: 98 MMHG | DIASTOLIC BLOOD PRESSURE: 62 MMHG | BODY MASS INDEX: 30.48 KG/M2 | WEIGHT: 225 LBS

## 2025-08-13 DIAGNOSIS — Z12.4 SCREENING FOR CERVICAL CANCER: ICD-10-CM

## 2025-08-13 DIAGNOSIS — E89.40 OVARIAN FAILURE DUE TO CANCER THERAPY: ICD-10-CM

## 2025-08-13 DIAGNOSIS — Z01.419 WELL WOMAN EXAM WITH ROUTINE GYNECOLOGICAL EXAM: Primary | ICD-10-CM

## 2025-08-16 LAB
CYTOLOGIST CVX/VAG CYTO: NORMAL
CYTOLOGY CVX/VAG DOC CYTO: NORMAL
CYTOLOGY CVX/VAG DOC THIN PREP: NORMAL
DX ICD CODE: NORMAL
HPV I/H RISK 4 DNA CVX QL PROBE+SIG AMP: NEGATIVE
OTHER STN SPEC: NORMAL
SERVICE CMNT-IMP: NORMAL
STAT OF ADQ CVX/VAG CYTO-IMP: NORMAL

## 2025-08-18 ENCOUNTER — OFFICE VISIT (OUTPATIENT)
Dept: ORTHOPEDIC SURGERY | Facility: CLINIC | Age: 45
End: 2025-08-18
Payer: COMMERCIAL

## 2025-08-18 VITALS — HEIGHT: 72 IN | WEIGHT: 225 LBS | TEMPERATURE: 97.3 F | BODY MASS INDEX: 30.48 KG/M2

## 2025-08-18 DIAGNOSIS — R52 PAIN: Primary | ICD-10-CM

## 2025-08-18 DIAGNOSIS — S92.505A CLOSED NONDISPLACED FRACTURE OF PHALANX OF LESSER TOE OF LEFT FOOT, UNSPECIFIED PHALANX, INITIAL ENCOUNTER: ICD-10-CM

## 2025-08-19 DIAGNOSIS — E03.9 HYPOTHYROIDISM, ACQUIRED: ICD-10-CM

## 2025-08-19 RX ORDER — LEVOTHYROXINE SODIUM 100 UG/1
100 TABLET ORAL
Qty: 90 TABLET | Refills: 0 | Status: SHIPPED | OUTPATIENT
Start: 2025-08-19

## (undated) DEVICE — SUT VIC 4/0 RB1 27IN J214H

## (undated) DEVICE — PREP SOL POVIDONE/IODINE BT 4OZ

## (undated) DEVICE — DRILL BIT: Brand: ORTHOLOC

## (undated) DEVICE — Device

## (undated) DEVICE — APPL CHLORAPREP HI/LITE 26ML ORNG

## (undated) DEVICE — TRAP FLD MINIVAC MEGADYNE 100ML

## (undated) DEVICE — WEBRIL* CAST PADDING: Brand: DEROYAL

## (undated) DEVICE — UNDERCAST PADDING: Brand: DEROYAL

## (undated) DEVICE — TEMP FIXATION K-WIRE: Brand: DARCO

## (undated) DEVICE — DRSNG GZ PETROLTM XEROFORM CURAD 1X8IN STRL

## (undated) DEVICE — GLV SURG SIGNATURE ESSENTIAL PF LTX SZ8

## (undated) DEVICE — BLANKT WARM UPPR/BDY ARM/OUT 57X196CM

## (undated) DEVICE — BNDG GZ SOF-FORM CONFRM 2X75IN LF STRL

## (undated) DEVICE — DRP C/ARM 41X74IN

## (undated) DEVICE — BNDG ELAS CO-FLEX SLF ADHR 2IN 5YD LF STRL

## (undated) DEVICE — SYS PERFUS SEP PLATLT W TIPS CUST

## (undated) DEVICE — SKIN PREP TRAY W/CHG: Brand: MEDLINE INDUSTRIES, INC.

## (undated) DEVICE — STPLR SKIN VISISTAT WD 35CT

## (undated) DEVICE — STAR 7 SELF RETAINING DRIVER: Brand: EVOLVE TRIAD

## (undated) DEVICE — GLV SURG BIOGEL LTX PF 8

## (undated) DEVICE — TBG PENCL TELESCP MEGADYNE SMOKE EVAC 10FT

## (undated) DEVICE — DRIVER STAR 7: Brand: ORTHOLOC

## (undated) DEVICE — PK ORTHO MINOR TOWER 40

## (undated) DEVICE — SMALL BONES: Brand: ORTHOLOC 3DI

## (undated) DEVICE — GOWN,NON-REINFORCED,SIRUS,SET IN SLV,XXL: Brand: MEDLINE

## (undated) DEVICE — ELECTRD NDL EZ CLN MOD 2.75IN